# Patient Record
Sex: FEMALE | Race: OTHER | Employment: UNEMPLOYED | URBAN - METROPOLITAN AREA
[De-identification: names, ages, dates, MRNs, and addresses within clinical notes are randomized per-mention and may not be internally consistent; named-entity substitution may affect disease eponyms.]

---

## 2022-12-29 ENCOUNTER — HOSPITAL ENCOUNTER (EMERGENCY)
Age: 27
Discharge: HOME | End: 2022-12-29
Attending: EMERGENCY MEDICINE
Payer: MEDICAID

## 2022-12-29 VITALS
RESPIRATION RATE: 20 BRPM | DIASTOLIC BLOOD PRESSURE: 80 MMHG | HEART RATE: 88 BPM | OXYGEN SATURATION: 100 % | TEMPERATURE: 98.1 F | SYSTOLIC BLOOD PRESSURE: 149 MMHG

## 2022-12-29 DIAGNOSIS — G47.00 INSOMNIA, UNSPECIFIED TYPE: ICD-10-CM

## 2022-12-29 DIAGNOSIS — O23.11 ACUTE CYSTITIS DURING PREGNANCY IN FIRST TRIMESTER: Primary | ICD-10-CM

## 2022-12-29 LAB
ALBUMIN SERPL-MCNC: 3.9 G/DL (ref 3.5–5)
ALBUMIN/GLOB SERPL: 1.2 {RATIO} (ref 1.1–2.2)
ALP SERPL-CCNC: 50 U/L (ref 45–117)
ALT SERPL-CCNC: 16 U/L (ref 12–78)
ANION GAP SERPL CALC-SCNC: 6 MMOL/L (ref 5–15)
APPEARANCE UR: ABNORMAL
AST SERPL-CCNC: 16 U/L (ref 15–37)
BACTERIA URNS QL MICRO: ABNORMAL /HPF
BASOPHILS # BLD: 0.1 K/UL (ref 0–0.1)
BASOPHILS NFR BLD: 1 % (ref 0–1)
BILIRUB SERPL-MCNC: 0.4 MG/DL (ref 0.2–1)
BILIRUB UR QL: NEGATIVE
BUN SERPL-MCNC: 9 MG/DL (ref 6–20)
BUN/CREAT SERPL: 15 (ref 12–20)
CALCIUM SERPL-MCNC: 9.1 MG/DL (ref 8.5–10.1)
CHLORIDE SERPL-SCNC: 103 MMOL/L (ref 97–108)
CO2 SERPL-SCNC: 26 MMOL/L (ref 21–32)
COLOR UR: ABNORMAL
COMMENT, HOLDF: NORMAL
CREAT SERPL-MCNC: 0.61 MG/DL (ref 0.55–1.02)
DIFFERENTIAL METHOD BLD: ABNORMAL
EOSINOPHIL # BLD: 0.1 K/UL (ref 0–0.4)
EOSINOPHIL NFR BLD: 1 % (ref 0–7)
EPITH CASTS URNS QL MICRO: ABNORMAL /LPF
ERYTHROCYTE [DISTWIDTH] IN BLOOD BY AUTOMATED COUNT: 13.5 % (ref 11.5–14.5)
GLOBULIN SER CALC-MCNC: 3.3 G/DL (ref 2–4)
GLUCOSE SERPL-MCNC: 74 MG/DL (ref 65–100)
GLUCOSE UR STRIP.AUTO-MCNC: NEGATIVE MG/DL
HCG UR QL: POSITIVE
HCT VFR BLD AUTO: 40.3 % (ref 35–47)
HGB BLD-MCNC: 13.2 G/DL (ref 11.5–16)
HGB UR QL STRIP: ABNORMAL
HYALINE CASTS URNS QL MICRO: ABNORMAL /LPF (ref 0–5)
IMM GRANULOCYTES # BLD AUTO: 0 K/UL (ref 0–0.04)
IMM GRANULOCYTES NFR BLD AUTO: 0 % (ref 0–0.5)
KETONES UR QL STRIP.AUTO: NEGATIVE MG/DL
LEUKOCYTE ESTERASE UR QL STRIP.AUTO: ABNORMAL
LYMPHOCYTES # BLD: 3.7 K/UL (ref 0.8–3.5)
LYMPHOCYTES NFR BLD: 40 % (ref 12–49)
MCH RBC QN AUTO: 28.2 PG (ref 26–34)
MCHC RBC AUTO-ENTMCNC: 32.8 G/DL (ref 30–36.5)
MCV RBC AUTO: 86.1 FL (ref 80–99)
MONOCYTES # BLD: 0.8 K/UL (ref 0–1)
MONOCYTES NFR BLD: 8 % (ref 5–13)
NEUTS SEG # BLD: 4.6 K/UL (ref 1.8–8)
NEUTS SEG NFR BLD: 50 % (ref 32–75)
NITRITE UR QL STRIP.AUTO: POSITIVE
NRBC # BLD: 0 K/UL (ref 0–0.01)
NRBC BLD-RTO: 0 PER 100 WBC
PH UR STRIP: 7.5 [PH] (ref 5–8)
PLATELET # BLD AUTO: 268 K/UL (ref 150–400)
PMV BLD AUTO: 9.1 FL (ref 8.9–12.9)
POTASSIUM SERPL-SCNC: 3.8 MMOL/L (ref 3.5–5.1)
PROT SERPL-MCNC: 7.2 G/DL (ref 6.4–8.2)
PROT UR STRIP-MCNC: NEGATIVE MG/DL
RBC # BLD AUTO: 4.68 M/UL (ref 3.8–5.2)
RBC #/AREA URNS HPF: ABNORMAL /HPF (ref 0–5)
SAMPLES BEING HELD,HOLD: NORMAL
SODIUM SERPL-SCNC: 135 MMOL/L (ref 136–145)
SP GR UR REFRACTOMETRY: 1.02 (ref 1–1.03)
UR CULT HOLD, URHOLD: NORMAL
UROBILINOGEN UR QL STRIP.AUTO: 0.2 EU/DL (ref 0.2–1)
WBC # BLD AUTO: 9.2 K/UL (ref 3.6–11)
WBC URNS QL MICRO: ABNORMAL /HPF (ref 0–4)

## 2022-12-29 PROCEDURE — 99283 EMERGENCY DEPT VISIT LOW MDM: CPT

## 2022-12-29 PROCEDURE — 87086 URINE CULTURE/COLONY COUNT: CPT

## 2022-12-29 PROCEDURE — 36415 COLL VENOUS BLD VENIPUNCTURE: CPT

## 2022-12-29 PROCEDURE — 85025 COMPLETE CBC W/AUTO DIFF WBC: CPT

## 2022-12-29 PROCEDURE — 81001 URINALYSIS AUTO W/SCOPE: CPT

## 2022-12-29 PROCEDURE — 81025 URINE PREGNANCY TEST: CPT

## 2022-12-29 PROCEDURE — 80053 COMPREHEN METABOLIC PANEL: CPT

## 2022-12-29 RX ORDER — CEPHALEXIN 500 MG/1
500 CAPSULE ORAL 2 TIMES DAILY
Qty: 14 CAPSULE | Refills: 0 | Status: SHIPPED | OUTPATIENT
Start: 2022-12-29 | End: 2023-01-05

## 2022-12-29 NOTE — DISCHARGE INSTRUCTIONS
Take new medication as prescribed. Continue to monitor symptoms at home. Take Tylenol as needed for pain. Follow-up with PCP and return with any changes or worsening.

## 2022-12-29 NOTE — ED PROVIDER NOTES
32 y.o.  female at ~4-6 weeks gestation with no significant PMH presents to ED with 1 week of insomnia and fatigue. She notes that she feels weak and fatigued in the morning due to not being able to sleep very well. She reports that she has only been able to sleep for about 3 hours per night. She reports \"I toss and turn all night and now my shoulders hurt\". She also adds that for the past 3-4 months she has had dysuria with urinary pressure. She had talked to a doctor about this and she was told that this was likely due to the fibroids. Was supposed to have a surgery to remove her fibroids at [facility] on  but had to cancel this due to pregnancy. She reports that she believes she is about 4-6 weeks pregnant. She notes that her LMP was ; has not seen a OB/GYN yet but is working on getting an appointment. Denies any fevers, chills, vomiting, diarrhea, CP, SOB. The history is provided by the patient. A  was used. Dysuria   Pertinent negatives include no nausea and no vomiting. Insomnia   Functional status baseline:  [EPIC#1537^NOTE}      History reviewed. No pertinent past medical history. History reviewed. No pertinent surgical history. History reviewed. No pertinent family history.     Social History     Socioeconomic History    Marital status: Not on file     Spouse name: Not on file    Number of children: Not on file    Years of education: Not on file    Highest education level: Not on file   Occupational History    Not on file   Tobacco Use    Smoking status: Not on file    Smokeless tobacco: Not on file   Substance and Sexual Activity    Alcohol use: Not on file    Drug use: Not on file    Sexual activity: Not on file   Other Topics Concern    Not on file   Social History Narrative    Not on file     Social Determinants of Health     Financial Resource Strain: Not on file   Food Insecurity: Not on file   Transportation Needs: Not on file   Physical Activity: Not on file   Stress: Not on file   Social Connections: Not on file   Intimate Partner Violence: Not on file   Housing Stability: Not on file         ALLERGIES: Patient has no known allergies. Review of Systems   Constitutional:  Negative for fever. HENT:  Negative for congestion and sinus pressure. Respiratory:  Negative for shortness of breath. Cardiovascular:  Negative for chest pain. Gastrointestinal:  Negative for nausea and vomiting. Genitourinary:  Positive for difficulty urinating and dysuria. Musculoskeletal:  Negative for myalgias. Neurological:  Negative for dizziness and headaches. Hematological:  Negative for adenopathy. Psychiatric/Behavioral:  Positive for sleep disturbance. The patient has insomnia. The patient is not nervous/anxious. All other systems reviewed and are negative. Vitals:    12/29/22 1350   BP: (!) 149/80   Pulse: 88   Resp: 20   Temp: 98.1 °F (36.7 °C)   SpO2: 100%            Physical Exam  Vitals and nursing note reviewed. Constitutional:       General: She is not in acute distress. Appearance: Normal appearance. She is normal weight. HENT:      Head: Normocephalic and atraumatic. Eyes:      Extraocular Movements: Extraocular movements intact. Pupils: Pupils are equal, round, and reactive to light. Cardiovascular:      Rate and Rhythm: Normal rate and regular rhythm. Heart sounds: Normal heart sounds. Pulmonary:      Breath sounds: Normal breath sounds. Abdominal:      Palpations: Abdomen is soft. Tenderness: There is no abdominal tenderness. Lymphadenopathy:      Cervical: No cervical adenopathy. Skin:     General: Skin is warm and dry. Neurological:      General: No focal deficit present. Mental Status: She is alert and oriented to person, place, and time. Psychiatric:         Mood and Affect: Mood normal.         Behavior: Behavior normal.         Thought Content:  Thought content normal.        MDM  Number of Diagnoses or Management Options  Acute cystitis during pregnancy in first trimester  Insomnia, unspecified type  Diagnosis management comments: 32 y.o.  female at ~4-6 weeks gestation with no significant PMH presents to ED with 1 week of insomnia and fatigue. Vital signs stable in triage and patient afebrile. Physical exam unremarkable with abdomen soft and nontender to palpation with no tenderness. Labs unremarkable aside from urine which revealed indication of urinary tract infection. Urine culture sent and patient will be discharged with antibiotics and instructions for conservative care, follow-up and return precautions. She is planning on follow-up with OB/GYN soon.        Amount and/or Complexity of Data Reviewed  Clinical lab tests: reviewed           Procedures

## 2023-01-01 LAB
BACTERIA SPEC CULT: ABNORMAL
CC UR VC: ABNORMAL
SERVICE CMNT-IMP: ABNORMAL

## 2023-01-03 NOTE — PROGRESS NOTES
Sonny Panchal is a 32 y.o. female presents for a new pregnancy visit. Chief Complaint   Patient presents with    Initial Prenatal Visit           Patient's last menstrual period was 2022. Last Pap: normal obtained 2 months ago. LMP history:  The date of her LMP is 01/4/15 certain. Her last menstrual period was normal and lasted for 4 to 5 days. A urine pregnancy test was positive z weeks ago. She was not on the pill at conception. Based on her LMP, her EDC is 23 and her EGA is 9 weeks,3 days. Her menstrual cycles are regular and occur approximately every 28 days  and range from 3 to 5 days. The last menses lasted 22 the usual number of days. Ultrasound data:  She had an  ultrasound done by Alek Montelongo  today which revealed a viable subramanian pregnancy with a gestational age of *** weeks and *** days giving an EDC of ***. Pregnancy symptoms:    Since her LMP she has experienced  urinary frequency, breast tenderness, and nausea. She  hasbeen vomiting over the last few weeks. Associated signs and symptoms which she denies: dysuria, discharge, vaginal bleeding. She states she has gained weight:  Approximately 5 pounds over the last few weeks. Relevant past pregnancy history:   She has the following pregnancy history: ***    She has no history of  delivery. Relevant past medical history:(relevant to this pregnancy): noncontributory. Her occupation is: unemployed. 1. Have you been to the ER, urgent care clinic, or hospitalized since your last visit? No    2. Have you seen or consulted any other health care providers outside of the [address] [place] [address] since your last visit? No    Examination chaperoned by Mariela Day LPN.

## 2023-01-06 ENCOUNTER — INITIAL PRENATAL (OUTPATIENT)
Dept: OBGYN CLINIC | Age: 28
End: 2023-01-06
Payer: MEDICAID

## 2023-01-06 VITALS — DIASTOLIC BLOOD PRESSURE: 65 MMHG | SYSTOLIC BLOOD PRESSURE: 105 MMHG | WEIGHT: 110.9 LBS

## 2023-01-06 DIAGNOSIS — Z34.90 ENCOUNTER FOR PRENATAL CARE: ICD-10-CM

## 2023-01-06 DIAGNOSIS — A64 STD (FEMALE): ICD-10-CM

## 2023-01-06 DIAGNOSIS — Z34.00 ENCOUNTER FOR SUPERVISION OF NORMAL FIRST PREGNANCY, UNSPECIFIED TRIMESTER: Primary | ICD-10-CM

## 2023-01-06 DIAGNOSIS — Z12.4 ENCOUNTER FOR PAPANICOLAOU SMEAR FOR CERVICAL CANCER SCREENING: ICD-10-CM

## 2023-01-06 LAB — N. GONORRHOEAE, EXTERNAL RESULT: NEGATIVE

## 2023-01-06 NOTE — PROGRESS NOTES
Current pregnancy history:    Emanuel Guevara is a ,  32 y.o. female / Patient's last menstrual period was 2022. She presents for the evaluation of amenorrhea and a positive pregnancy test.    Patient's last menstrual period was 2022. Last Pap: normal obtained 2 months ago. LMP history:  The date of her LMP is  certain. Her last menstrual period was normal and lasted for 4 to 5 days. A urine pregnancy test was positive z weeks ago. She was not on the pill at conception. Based on her LMP, her EDC is 23 and her EGA is 9 weeks, 3 days. Her menstrual cycles are regular and occur approximately every 28 days  and range from 3 to 5 days. The last menses lasted 22 the usual number of days. Ultrasound data:  She had an  ultrasound done by Rangel Shrestha  today which revealed a viable subramanian pregnancy with a gestational age of 10 weeks and 1 day giving an EDC of 8/10/23. Pregnancy symptoms:     Since her LMP she has experienced  urinary frequency, breast tenderness, and nausea. She  hasbeen vomiting over the last few weeks. Associated signs and symptoms which she denies: dysuria, discharge, vaginal bleeding. She states she has gained weight:  Approximately 5 pounds over the last few weeks. Relevant past pregnancy history:              She has the following pregnancy history: first pregnancy               She has no history of  delivery. Relevant past medical history:(relevant to this pregnancy): noncontributory. Substance history: negative for alcohol, tobacco and street drugs. Positive for nothing. Exposure history: There is/are no indoor cat/s in the home. The patient was instructed to not change the cat litter. She has had chicken pox or the vaccine in the past.   Patient denies issues with domestic violence.      Genetic Screening/Teratology Counseling: (Includes patient, baby's father, or anyone in either family with:)  3.  Patient's age >/= 28 at [facility]?-- no.   2.  Thalassemia ([place], [place], [address], or  background): MCV<80?--no.     3.  Neural tube defect (meningomyelocele, spina bifida, anencephaly)?--no.   4.  Congenital heart defect?--no.  5.  Down syndrome?--no.   6.  Marcos-Sachs ([place], [place])?--no.   7.  Canavan's Disease?--no.   8.  Familial Dysautonomia?--no.   9.  Sickle cell disease or trait ()? --no   The patient has not been tested for sickle trait  10. Hemophilia or other blood disorders?--no. 11.  Muscular dystrophy?--no. 12.  Cystic fibrosis?--no. 13.  Zeferino's Chorea?--no. 14.  Mental retardation/autism (if yes was person tested for Fragile X)?--no. 15.  Other inherited genetic or chromosomal disorder?--no. 12.  Maternal metabolic disorder (DM, PKU, etc)?--no. 17.  Patient or FOB with a child with a birth defect not listed above?--no.  17a. Patient or FOB with a birth defect themselves?--no. 18.  Recurrent pregnancy loss, or stillbirth?--no. 19.  Any medications since LMP other than prenatal vitamins (include vitamins, supplements, OTC meds, drugs, alcohol)?--no. 20.  Any other genetic/environmental exposure to discuss?--no. Infection History:  1. Lives with someone with TB or TB exposed?--no.   2.  Patient or partner has history of genital herpes?--no.  3.  Rash or viral illness since LMP?--no.    4.  History of STD (GC, CT, HPV, syphilis, HIV)? --no   5. Other: OTHER? Past Medical History:   Diagnosis Date    Fibroids      No past surgical history on file.   Social History     Occupational History    Not on file   Tobacco Use    Smoking status: Not on file    Smokeless tobacco: Not on file   Substance and Sexual Activity    Alcohol use: Not on file    Drug use: Not on file    Sexual activity: Not on file     Family History   Problem Relation Age of Onset    Breast Cancer Maternal Aunt      OB History    Para Term  AB Living   1 SAB IAB Ectopic Molar Multiple Live Births                    # Outcome Date GA Lbr Amilcar/2nd Weight Sex Delivery Anes PTL Lv   1 Current              No Known Allergies  Prior to Admission medications    Medication Sig Start Date End Date Taking? Authorizing Provider   docosahexaenoic acid (PRENATAL DHA PO) Take  by mouth. Yes Provider, Historical   cephALEXin (Keflex) 500 mg capsule Take 1 Capsule by mouth two (2) times a day for 7 days. 12/29/22 1/5/23  YARITZA Marti        Review of Systems: History obtained from the patient  Constitutional: negative for weight loss, fever, night sweats  HEENT: negative for hearing loss, earache, congestion, snoring, sore throat  CV: negative for chest pain, palpitations, edema  Resp: negative for cough, shortness of breath, wheezing  Breast: negative for breast lumps, nipple discharge, galactorrhea  GI: negative for change in bowel habits, abdominal pain, black or bloody stools  : negative for frequency, dysuria, hematuria, vaginal discharge  MSK: negative for back pain, joint pain, muscle pain  Skin: negative for itching, rash, hives  Neuro: negative for dizziness, headache, confusion, weakness  Psych: negative for anxiety, depression, change in mood  Heme/lymph: negative for bleeding, bruising, pallor    Objective:  Visit Vitals  /65   Wt 110 lb 14.4 oz (50.3 kg)   LMP 11/01/2022       Physical Exam:     Constitutional  Appearance: well-nourished, well developed, alert, in no acute distress    HENT  Head  Face: appears normal  Eyes: appear normal  Ears: normal  Mouth: normal  Lips: no lesions    Neck  Inspection/Palpation: normal appearance, no masses or tenderness  Lymph Nodes: no lymphadenopathy present  Thyroid: gland size normal, nontender, no nodules or masses present on palpation    Chest  Respiratory Effort: breathing unlabored  Auscultation: normal breath sounds    Cardiovascular  Heart:   Auscultation: regular rate and rhythm without murmur    Breasts  Inspection of Breasts: breasts symmetrical, no skin changes, no discharge present, nipple appearance normal, no skin retraction present  Palpation of Breasts and Axillae: no masses present on palpation, no breast tenderness  Axillary Lymph Nodes: no lymphadenopathy present    Gastrointestinal  Abdominal Examination: abdomen non-tender to palpation, normal bowel sounds, no masses present  Liver and spleen: no hepatomegaly present, spleen not palpable  Hernias: no hernias identified    Genitourinary  External Genitalia: normal appearance for age, no discharge present, no tenderness present, no inflammatory lesions present, no masses present, no atrophy present  Vagina: normal vaginal vault without central or paravaginal defects, no discharge present, no inflammatory lesions present, no masses present  Bladder: non-tender to palpation  Urethra: appears normal  Cervix: normal   Uterus: enlarged, normal shape, soft  Adnexa: no adnexal tenderness present, no adnexal masses present  Perineum: perineum within normal limits, no evidence of trauma, no rashes or skin lesions present  Anus: anus within normal limits, no hemorrhoids present  Inguinal Lymph Nodes: no lymphadenopathy present    Skin  General Inspection: no rash, no lesions identified    Neurologic/Psychiatric  Mental Status:  Orientation: grossly oriented to person, place and time  Mood and Affect: mood normal, affect appropriate    Assessment:   Intrauterine pregnancy with the following problems identified:  Pedunculated fibroid left side--5 cm at 9/ declines CFDNA  . Plan:     Offered CFDNA testing, discussed MSAFP  Course of pregnancy discussed including visit schedule, routine U/S, glucola testing, etc.  Avoid alcoholic beverages and illicit/recreational drugs use  Advised prenatal vitamins or folic acid daily. Hospital and practice style discussed with [facility] coverage system.   Discussed nutrition, toxoplasmosis precautions, sexual activity, exercise, need for influenza,TDAP, Covid vaccines, environmental and work hazards, travel advice, need for seat belts. Discussed dietary concerns regarding mercury in seafood, Listeria in unpasteurized dairy products, deli meat. Patient encouraged not to smoke. Discussed current prescription drug use. Given medication list.  Discussed the use of over the counter medications and chemicals. Route of delivery discussed, including risks, benefits, and alternatives of  versus repeat LTCS. Pt understands risk of hemorrhage during pregnancy and post delivery and would accept blood products if necessary in life-threatening emergencies    Handouts given to pt. Transvaginal First Trimester Ultrasound Procedure    Marcio Tony is a ,  32 y.o. female / Patient's last menstrual period was 2022. This makes her currently 8 weeks and 1 days of gestation by dates. She is here today for early pregnancy ultrasound for pregnancy dating. Ultrasound results:   A subramanian intrauterine pregnancy with visible cardiac activity is seen. The yolk sac is visible and measures approximately 0.44 cm in diameter.   The crown rump length of the fetus is measurable at 2.44 cm, consistent with 9 weeks and 1 day  Subchorionic hemorrhage was not seen  Right Ovary - NORMAL   Left Ovary - NORMAL   Comment: Pedunculated fibroid left side 5 cm

## 2023-01-08 LAB
A VAGINAE DNA VAG QL NAA+PROBE: NORMAL SCORE
BVAB2 DNA VAG QL NAA+PROBE: NORMAL SCORE
C ALBICANS DNA VAG QL NAA+PROBE: NEGATIVE
C GLABRATA DNA VAG QL NAA+PROBE: NEGATIVE
C TRACH DNA VAG QL NAA+PROBE: NEGATIVE
MEGA1 DNA VAG QL NAA+PROBE: NORMAL SCORE
N GONORRHOEA DNA VAG QL NAA+PROBE: NEGATIVE
T VAGINALIS DNA VAG QL NAA+PROBE: NEGATIVE

## 2023-01-20 ENCOUNTER — ROUTINE PRENATAL (OUTPATIENT)
Dept: OBGYN CLINIC | Age: 28
End: 2023-01-20
Payer: MEDICAID

## 2023-01-20 VITALS — DIASTOLIC BLOOD PRESSURE: 68 MMHG | WEIGHT: 111 LBS | SYSTOLIC BLOOD PRESSURE: 115 MMHG

## 2023-01-20 DIAGNOSIS — Z34.90 ENCOUNTER FOR PRENATAL CARE: Primary | ICD-10-CM

## 2023-01-20 RX ORDER — TERCONAZOLE 4 MG/G
1 CREAM VAGINAL
Qty: 45 G | Refills: 0 | Status: SHIPPED | OUTPATIENT
Start: 2023-01-20

## 2023-01-20 NOTE — LETTER
1/20/2023 3:45 PM    Ms. Alex Calderon  [address] [phone]    To whom it may concern,  Please excuse  Ms. Alex Calderon from exceeding eight hours of work per day for the remainder of her pregnancy with an MARKUS of 8/8/2023. If you have any questions or concerns please have the patient contact us.                Sincerely,        Paty Dumont MD

## 2023-01-27 ENCOUNTER — LAB ONLY (OUTPATIENT)
Dept: OBGYN CLINIC | Age: 28
End: 2023-01-27

## 2023-01-27 DIAGNOSIS — Z34.90 ENCOUNTER FOR PRENATAL CARE: Primary | ICD-10-CM

## 2023-01-27 DIAGNOSIS — Z13.79 GENETIC TESTING: ICD-10-CM

## 2023-01-27 LAB
ABO, EXTERNAL RESULT: NORMAL
ANTIBODY SCREEN, EXTERNAL: NEGATIVE
HBSAG, EXTERNAL: NEGATIVE
HEP B, EXTERNAL RESULT: NEGATIVE
HEPATITIS C AB,   EXT: NEGATIVE
HEPATITIS C ANTIBODY, EXTERNAL RESULT: NEGATIVE
HIV, EXTERNAL RESULT: NONREACTIVE
HIV, EXTERNAL: NEGATIVE
RPR, EXTERNAL RESULT: NEGATIVE
RPR, EXTERNAL: NEGATIVE
RUBELLA TITER, EXTERNAL RESULT: NORMAL
RUBELLA, EXTERNAL: NORMAL
TYPE, ABO & RH, EXTERNAL: NORMAL

## 2023-02-02 ENCOUNTER — APPOINTMENT (OUTPATIENT)
Dept: ULTRASOUND IMAGING | Age: 28
End: 2023-02-02
Attending: EMERGENCY MEDICINE
Payer: MEDICAID

## 2023-02-02 ENCOUNTER — HOSPITAL ENCOUNTER (EMERGENCY)
Age: 28
Discharge: HOME | End: 2023-02-02
Attending: STUDENT IN AN ORGANIZED HEALTH CARE EDUCATION/TRAINING PROGRAM
Payer: MEDICAID

## 2023-02-02 ENCOUNTER — ROUTINE PRENATAL (OUTPATIENT)
Dept: OBGYN CLINIC | Age: 28
End: 2023-02-02
Payer: MEDICAID

## 2023-02-02 VITALS
TEMPERATURE: 98.3 F | BODY MASS INDEX: 22.38 KG/M2 | DIASTOLIC BLOOD PRESSURE: 76 MMHG | OXYGEN SATURATION: 100 % | RESPIRATION RATE: 16 BRPM | HEART RATE: 96 BPM | WEIGHT: 111 LBS | SYSTOLIC BLOOD PRESSURE: 119 MMHG | HEIGHT: 59 IN

## 2023-02-02 VITALS — BODY MASS INDEX: 22.72 KG/M2 | DIASTOLIC BLOOD PRESSURE: 57 MMHG | SYSTOLIC BLOOD PRESSURE: 91 MMHG | WEIGHT: 111.2 LBS

## 2023-02-02 DIAGNOSIS — N30.00 ACUTE CYSTITIS WITHOUT HEMATURIA: ICD-10-CM

## 2023-02-02 DIAGNOSIS — O46.90 VAGINAL BLEEDING IN PREGNANCY: Primary | ICD-10-CM

## 2023-02-02 DIAGNOSIS — D25.9 UTERINE LEIOMYOMA, UNSPECIFIED LOCATION: ICD-10-CM

## 2023-02-02 DIAGNOSIS — Z34.90 ENCOUNTER FOR PRENATAL CARE: Primary | ICD-10-CM

## 2023-02-02 LAB
ALBUMIN SERPL-MCNC: 3.4 G/DL (ref 3.5–5)
ALBUMIN/GLOB SERPL: 0.9 (ref 1.1–2.2)
ALP SERPL-CCNC: 44 U/L (ref 45–117)
ALT SERPL-CCNC: 15 U/L (ref 12–78)
AMORPH CRY URNS QL MICRO: ABNORMAL
ANION GAP SERPL CALC-SCNC: 8 MMOL/L (ref 5–15)
APPEARANCE UR: ABNORMAL
AST SERPL-CCNC: 11 U/L (ref 15–37)
BACTERIA URNS QL MICRO: ABNORMAL /HPF
BASOPHILS # BLD: 0.1 K/UL (ref 0–0.1)
BASOPHILS NFR BLD: 1 % (ref 0–1)
BILIRUB SERPL-MCNC: 0.4 MG/DL (ref 0.2–1)
BILIRUB UR QL: NEGATIVE
BUN SERPL-MCNC: 7 MG/DL (ref 6–20)
BUN/CREAT SERPL: 11 (ref 12–20)
CALCIUM SERPL-MCNC: 8.9 MG/DL (ref 8.5–10.1)
CHLORIDE SERPL-SCNC: 106 MMOL/L (ref 97–108)
CO2 SERPL-SCNC: 24 MMOL/L (ref 21–32)
COLOR UR: ABNORMAL
COMMENT, HOLDF: NORMAL
CREAT SERPL-MCNC: 0.62 MG/DL (ref 0.55–1.02)
DIFFERENTIAL METHOD BLD: ABNORMAL
EOSINOPHIL # BLD: 0.2 K/UL (ref 0–0.4)
EOSINOPHIL NFR BLD: 1 % (ref 0–7)
EPITH CASTS URNS QL MICRO: ABNORMAL /LPF
ERYTHROCYTE [DISTWIDTH] IN BLOOD BY AUTOMATED COUNT: 13.3 % (ref 11.5–14.5)
GLOBULIN SER CALC-MCNC: 3.8 G/DL (ref 2–4)
GLUCOSE SERPL-MCNC: 82 MG/DL (ref 65–100)
GLUCOSE UR STRIP.AUTO-MCNC: NEGATIVE MG/DL
HCG SERPL-ACNC: ABNORMAL MIU/ML (ref 0–6)
HCT VFR BLD AUTO: 37.9 % (ref 35–47)
HGB BLD-MCNC: 12.9 G/DL (ref 11.5–16)
HGB UR QL STRIP: ABNORMAL
IMM GRANULOCYTES # BLD AUTO: 0.1 K/UL (ref 0–0.04)
IMM GRANULOCYTES NFR BLD AUTO: 1 % (ref 0–0.5)
KETONES UR QL STRIP.AUTO: NEGATIVE MG/DL
LEUKOCYTE ESTERASE UR QL STRIP.AUTO: NEGATIVE
LYMPHOCYTES # BLD: 4 K/UL (ref 0.8–3.5)
LYMPHOCYTES NFR BLD: 33 % (ref 12–49)
Lab: NORMAL
MCH RBC QN AUTO: 28.5 PG (ref 26–34)
MCHC RBC AUTO-ENTMCNC: 34 G/DL (ref 30–36.5)
MCV RBC AUTO: 83.8 FL (ref 80–99)
MONOCYTES # BLD: 0.9 K/UL (ref 0–1)
MONOCYTES NFR BLD: 7 % (ref 5–13)
NEUTS SEG # BLD: 7 K/UL (ref 1.8–8)
NEUTS SEG NFR BLD: 57 % (ref 32–75)
NITRITE UR QL STRIP.AUTO: NEGATIVE
NRBC # BLD: 0 K/UL (ref 0–0.01)
NRBC BLD-RTO: 0 PER 100 WBC
PH UR STRIP: 8.5 (ref 5–8)
PLATELET # BLD AUTO: 286 K/UL (ref 150–400)
PMV BLD AUTO: 9 FL (ref 8.9–12.9)
POTASSIUM SERPL-SCNC: 3.7 MMOL/L (ref 3.5–5.1)
PROT SERPL-MCNC: 7.2 G/DL (ref 6.4–8.2)
PROT UR STRIP-MCNC: NEGATIVE MG/DL
RBC # BLD AUTO: 4.52 M/UL (ref 3.8–5.2)
RBC #/AREA URNS HPF: ABNORMAL /HPF (ref 0–5)
SAMPLES BEING HELD,HOLD: NORMAL
SODIUM SERPL-SCNC: 138 MMOL/L (ref 136–145)
SP GR UR REFRACTOMETRY: 1.01 (ref 1–1.03)
UR CULT HOLD, URHOLD: NORMAL
UROBILINOGEN UR QL STRIP.AUTO: 0.2 EU/DL (ref 0.2–1)
WBC # BLD AUTO: 12.3 K/UL (ref 3.6–11)
WBC URNS QL MICRO: ABNORMAL /HPF (ref 0–4)

## 2023-02-02 PROCEDURE — 85025 COMPLETE CBC W/AUTO DIFF WBC: CPT

## 2023-02-02 PROCEDURE — 80053 COMPREHEN METABOLIC PANEL: CPT

## 2023-02-02 PROCEDURE — 76801 OB US < 14 WKS SINGLE FETUS: CPT

## 2023-02-02 PROCEDURE — 99284 EMERGENCY DEPT VISIT MOD MDM: CPT

## 2023-02-02 PROCEDURE — 81001 URINALYSIS AUTO W/SCOPE: CPT

## 2023-02-02 PROCEDURE — 87086 URINE CULTURE/COLONY COUNT: CPT

## 2023-02-02 PROCEDURE — 36415 COLL VENOUS BLD VENIPUNCTURE: CPT

## 2023-02-02 PROCEDURE — 84702 CHORIONIC GONADOTROPIN TEST: CPT

## 2023-02-02 RX ORDER — CEPHALEXIN 500 MG/1
500 CAPSULE ORAL 4 TIMES DAILY
Qty: 28 CAPSULE | Refills: 0 | Status: SHIPPED | OUTPATIENT
Start: 2023-02-02 | End: 2023-02-09

## 2023-02-02 NOTE — ED PROVIDER NOTES
Please note that this dictation was completed with [email or web address], the computer voice recognition software. Quite often unanticipated grammatical, syntax, homophones, and other interpretive errors are inadvertently transcribed by the computer software. Please disregard these errors. Please excuse any errors that have escaped final proofreading. Patient is a 80-year-old female with history of uterine fibroids presenting to ED for evaluation of pelvic pain and vaginal bleeding in pregnancy. States that she is about 13 weeks pregnant and noticed some light bleeding this morning. She describes bleeding as a dark spotting, has not needed to change a pad. This is her first pregnancy and she is followed by Dr. Scarlett Cantu, has had normal routine OB care thus far. She has also been experiencing some nausea and vomiting throughout the pregnancy, last episode of vomiting was yesterday. Does not currently feel nauseous. Also states that she has been having some burning with urination but that this is \"always been like this\" and was treated with a cream by her OB/GYN. She denies fever, chills, vaginal discharge, hematuria, stool changes, or any additional medical complaints at this time. Past Medical History:   Diagnosis Date    Fibroids        No past surgical history on file.       Family History:   Problem Relation Age of Onset    Breast Cancer Maternal Aunt        Social History     Socioeconomic History    Marital status: UNKNOWN     Spouse name: Not on file    Number of children: Not on file    Years of education: Not on file    Highest education level: Not on file   Occupational History    Not on file   Tobacco Use    Smoking status: Not on file    Smokeless tobacco: Not on file   Substance and Sexual Activity    Alcohol use: Not on file    Drug use: Not on file    Sexual activity: Not on file   Other Topics Concern    Not on file   Social History Narrative    Not on file     Social Determinants of Health Financial Resource Strain: Not on file   Food Insecurity: Not on file   Transportation Needs: Not on file   Physical Activity: Not on file   Stress: Not on file   Social Connections: Not on file   Intimate Partner Violence: Not on file   Housing Stability: Not on file         ALLERGIES: Patient has no known allergies. Review of Systems   Constitutional:  Negative for fever. HENT:  Negative for congestion and sore throat. Eyes:  Negative for visual disturbance. Respiratory:  Negative for cough and shortness of breath. Cardiovascular:  Negative for chest pain. Gastrointestinal:  Negative for abdominal pain, diarrhea, nausea and vomiting. Genitourinary:  Positive for pelvic pain and vaginal bleeding. Negative for dysuria. Musculoskeletal:  Negative for back pain. Skin:  Negative for color change. Neurological:  Negative for dizziness and headaches. Psychiatric/Behavioral:  Negative for confusion. Vitals:    02/02/23 1020   BP: 119/76   Pulse: 96   Resp: 16   Temp: 98.3 °F (36.8 °C)   SpO2: 100%   Weight: 50.3 kg (111 lb)   Height: 4' 10.66\" (1.49 m)            Physical Exam  Vitals and nursing note reviewed. Constitutional:       General: She is not in acute distress. Appearance: Normal appearance. She is not ill-appearing. HENT:      Head: Normocephalic and atraumatic. Eyes:      General: Vision grossly intact. Extraocular Movements: Extraocular movements intact. Conjunctiva/sclera: Conjunctivae normal.   Neck:      Trachea: Phonation normal.   Cardiovascular:      Rate and Rhythm: Normal rate and regular rhythm. Heart sounds: Normal heart sounds. Pulmonary:      Effort: Pulmonary effort is normal.      Breath sounds: Normal breath sounds. Abdominal:      Palpations: Abdomen is soft. Tenderness: There is no abdominal tenderness. Musculoskeletal:         General: Normal range of motion. Cervical back: Normal range of motion.    Skin:     General: Skin is warm and dry. Neurological:      General: No focal deficit present. Mental Status: She is alert and oriented to person, place, and time. Medical Decision Making  This is a 33 yo G1 F who presents to the emergency room ambulatory with stable vitals signs with complaints of pelvic cramping and light vaginal bleeding, 13 weeks pregnant. I personally reviewed any available previous notes and chronic medical issues  Differential Diagnoses: Spontaneous , threatened miscarriage, fibroids, UTI, subchorionic hematoma, other benign pregnancy related vaginal bleeding, other  On physical exam her abdomen is soft and nontender. Pelvic exam deferred, no signs of hemorrhage due to clinical status and history. I ordered and interpreted the following tests: Per chart review she is Rh +. Labs here reassuring, hemoglobin stable ,slight leukocytosis which may be related to vomiting. Pelvic ultrasound with viable IUP with good fetal heart rate and fibroid. Urine with 1+ bacteria, culture ordered, may be contamination but will treat due to reported dysuria and pregnancy. Interventions and Plan: Will start on abx and advise patient on pelvic rest and follow-up with her OB. Return precautions outlined. Amount and/or Complexity of Data Reviewed  Labs: ordered. Decision-making details documented in ED Course. Radiology: ordered. Decision-making details documented in ED Course. ECG/medicine tests: ordered. ED Course as of 23 1112   Thu 2023   1111 US PREG UTS < 14 WKS SNGL  IMPRESSION  1. Single, living, intrauterine pregnancy with an AUA of 13 weeks, 3 days by  crown-rump length. 2.  Uterine fibroid. 3.  Unremarkable right ovary. Left ovary not visualized. [EP]   5610 HGB: 12.9 [EP]      ED Course User Index  [EP] YARITZA Ernandez     11:45 AM  Pt has been reevaluated. There are no new complaints, changes, or physical findings at this time.  All results have been reviewed with patient and/or family. Medications have been reviewed w/ pt and/or family. Pt and/or family's questions have been answered. Pt and/or family expressed good understanding of the dx/tx/rx and is in agreement with plan of care. Pt instructed and agreed to f/u w/ OBGYN and to return to ED upon further deterioration. Return precautions outlined. All questions answered at this time. Pt is stable and ready for discharge. IMPRESSION:  1. Vaginal bleeding in pregnancy    2. Uterine leiomyoma, unspecified location    3. Acute cystitis without hematuria        PLAN:  1. Current Discharge Medication List        START taking these medications    Details   cephALEXin (Keflex) 500 mg capsule Take 1 Capsule by mouth four (4) times daily for 7 days. Qty: 28 Capsule, Refills: 0  Start date: 2/2/2023, End date: 2/9/2023           2.    Follow-up Information       Follow up With Specialties Details Why Contact Info    Sabrina Hull MD Obstetrics & Gynecology, Gynecology, Obstetrics Schedule an appointment as soon as possible for a visit   [address] [place] [address] [phone]                Return to ED if worse     Procedures

## 2023-02-02 NOTE — ED TRIAGE NOTES
Pt arrives to the ER for complaints of vaginal bleeding and lower pelvic pain that started this morning. Pt reports that the bleeding is black. Pt reports that she is experiencing dysuria but states that this is normal.    Pt is currently 13 weeks pregnant with first child. Denies any PMH.

## 2023-02-02 NOTE — PROGRESS NOTES
Seen in ER with bleeding today.   US normal  Also reflux--rx discussed  Keep appt for 2 weeks for AFP

## 2023-02-03 LAB
BACTERIA SPEC CULT: NORMAL
SERVICE CMNT-IMP: NORMAL

## 2023-02-17 ENCOUNTER — ROUTINE PRENATAL (OUTPATIENT)
Dept: OBGYN CLINIC | Age: 28
End: 2023-02-17
Payer: MEDICAID

## 2023-02-17 VITALS — BODY MASS INDEX: 22.92 KG/M2 | WEIGHT: 112.2 LBS | DIASTOLIC BLOOD PRESSURE: 60 MMHG | SYSTOLIC BLOOD PRESSURE: 99 MMHG

## 2023-02-17 DIAGNOSIS — Z34.90 ENCOUNTER FOR PRENATAL CARE: Primary | ICD-10-CM

## 2023-02-17 PROCEDURE — 0502F SUBSEQUENT PRENATAL CARE: CPT | Performed by: OBSTETRICS & GYNECOLOGY

## 2023-02-23 ENCOUNTER — LAB ONLY (OUTPATIENT)
Dept: OBGYN CLINIC | Age: 28
End: 2023-02-23

## 2023-02-23 DIAGNOSIS — Z34.90 ENCOUNTER FOR PRENATAL CARE: Primary | ICD-10-CM

## 2023-02-25 LAB
AFP INTERP SERPL-IMP: NORMAL
AFP INTERP SERPL-IMP: NORMAL
AFP MOM SERPL: 1.36
AFP SERPL-MCNC: 59.7 NG/ML
AFP, MATERNAL, EXTERNAL: NEGATIVE
AGE AT DELIVERY: 28.2 YR
COMMENT, 018013: NORMAL
GA METHOD: NORMAL
GA: 16.2 WEEKS
IDDM PATIENT QL: NO
MULTIPLE PREGNANCY: NO
NEURAL TUBE DEFECT RISK FETUS: 4034 %
RESULTS, 017004: NORMAL

## 2023-03-18 ENCOUNTER — HOSPITAL ENCOUNTER (EMERGENCY)
Age: 28
Discharge: HOME | End: 2023-03-18
Attending: EMERGENCY MEDICINE
Payer: MEDICAID

## 2023-03-18 ENCOUNTER — APPOINTMENT (OUTPATIENT)
Dept: ULTRASOUND IMAGING | Age: 28
End: 2023-03-18
Attending: NURSE PRACTITIONER
Payer: MEDICAID

## 2023-03-18 VITALS
RESPIRATION RATE: 16 BRPM | TEMPERATURE: 97.4 F | WEIGHT: 118.4 LBS | DIASTOLIC BLOOD PRESSURE: 67 MMHG | SYSTOLIC BLOOD PRESSURE: 103 MMHG | HEIGHT: 59 IN | BODY MASS INDEX: 23.87 KG/M2 | OXYGEN SATURATION: 100 % | HEART RATE: 102 BPM

## 2023-03-18 DIAGNOSIS — D72.829 LEUKOCYTOSIS, UNSPECIFIED TYPE: ICD-10-CM

## 2023-03-18 DIAGNOSIS — R10.9 ABDOMINAL PAIN DURING PREGNANCY IN SECOND TRIMESTER: Primary | ICD-10-CM

## 2023-03-18 DIAGNOSIS — O26.892 ABDOMINAL PAIN DURING PREGNANCY IN SECOND TRIMESTER: Primary | ICD-10-CM

## 2023-03-18 LAB
ALBUMIN SERPL-MCNC: 3.5 G/DL (ref 3.5–5.2)
ALBUMIN/GLOB SERPL: 1.2 (ref 1.1–2.2)
ALP SERPL-CCNC: 64 U/L (ref 35–104)
ALT SERPL-CCNC: 12 U/L (ref 10–35)
ANION GAP SERPL CALC-SCNC: 9 MMOL/L (ref 5–15)
APPEARANCE UR: CLEAR
AST SERPL-CCNC: 20 U/L (ref 10–35)
BACTERIA URNS QL MICRO: ABNORMAL /HPF
BASOPHILS # BLD: 0.1 K/UL (ref 0–1)
BASOPHILS NFR BLD: 1 % (ref 0–1)
BILIRUB SERPL-MCNC: 0.2 MG/DL (ref 0.2–1)
BILIRUB UR QL: NEGATIVE
BUN SERPL-MCNC: 7 MG/DL (ref 6–20)
BUN/CREAT SERPL: 15 (ref 12–20)
CALCIUM SERPL-MCNC: 8.7 MG/DL (ref 8.6–10)
CHLORIDE SERPL-SCNC: 104 MMOL/L (ref 98–107)
CO2 SERPL-SCNC: 24 MMOL/L (ref 22–29)
COLOR UR: ABNORMAL
COMMENT, HOLDF: NORMAL
CREAT SERPL-MCNC: 0.47 MG/DL (ref 0.5–0.9)
DIFFERENTIAL METHOD BLD: ABNORMAL
EOSINOPHIL # BLD: 0.2 K/UL (ref 0–0.4)
EOSINOPHIL NFR BLD: 1 %
EPITH CASTS URNS QL MICRO: ABNORMAL /LPF
ERYTHROCYTE [DISTWIDTH] IN BLOOD BY AUTOMATED COUNT: 14.6 % (ref 11.5–14.5)
GLOBULIN SER CALC-MCNC: 2.9 G/DL (ref 2–4)
GLUCOSE SERPL-MCNC: 95 MG/DL (ref 65–100)
GLUCOSE UR STRIP.AUTO-MCNC: NEGATIVE MG/DL
HCG SERPL-ACNC: ABNORMAL MIU/ML
HCT VFR BLD AUTO: 33.1 % (ref 35–47)
HGB BLD-MCNC: 11.2 G/DL (ref 11.5–16)
HGB UR QL STRIP: NEGATIVE
IMM GRANULOCYTES # BLD AUTO: 0.3 K/UL (ref 0–0.04)
IMM GRANULOCYTES NFR BLD AUTO: 2 % (ref 0–0.5)
KETONES UR QL STRIP.AUTO: NEGATIVE MG/DL
LEUKOCYTE ESTERASE UR QL STRIP.AUTO: NEGATIVE
LYMPHOCYTES # BLD: 2.3 K/UL (ref 0.8–3.5)
LYMPHOCYTES NFR BLD: 14 % (ref 12–49)
MCH RBC QN AUTO: 29.2 PG (ref 26–34)
MCHC RBC AUTO-ENTMCNC: 33.8 G/DL (ref 30–36.5)
MCV RBC AUTO: 86.2 FL (ref 80–99)
MONOCYTES # BLD: 1.2 K/UL (ref 0–1)
MONOCYTES NFR BLD: 7 % (ref 5–13)
NEUTS SEG # BLD: 13.1 K/UL (ref 1.8–8)
NEUTS SEG NFR BLD: 75 % (ref 32–75)
NITRITE UR QL STRIP.AUTO: NEGATIVE
NRBC # BLD: 0 K/UL (ref 0–0.01)
NRBC BLD-RTO: 0 PER 100 WBC
PH UR STRIP: 6 (ref 5–8)
PLATELET # BLD AUTO: 257 K/UL (ref 150–400)
PMV BLD AUTO: 9.1 FL (ref 8.9–12.9)
POTASSIUM SERPL-SCNC: 3.8 MMOL/L (ref 3.5–5.1)
PROT SERPL-MCNC: 6.4 G/DL (ref 6.4–8.3)
PROT UR STRIP-MCNC: NEGATIVE MG/DL
RBC # BLD AUTO: 3.84 M/UL (ref 3.8–5.2)
RBC #/AREA URNS HPF: ABNORMAL /HPF
SAMPLES BEING HELD,HOLD: NORMAL
SODIUM SERPL-SCNC: 137 MMOL/L (ref 136–145)
SP GR UR REFRACTOMETRY: 1.01 (ref 1–1.03)
UA: UC IF INDICATED,UAUC: ABNORMAL
UROBILINOGEN UR QL STRIP.AUTO: 0.2 EU/DL (ref 0.2–1)
WBC # BLD AUTO: 17.2 K/UL (ref 3.6–11)
WBC URNS QL MICRO: ABNORMAL /HPF (ref 0–4)

## 2023-03-18 PROCEDURE — 74011250636 HC RX REV CODE- 250/636: Performed by: NURSE PRACTITIONER

## 2023-03-18 PROCEDURE — 96361 HYDRATE IV INFUSION ADD-ON: CPT

## 2023-03-18 PROCEDURE — 84702 CHORIONIC GONADOTROPIN TEST: CPT

## 2023-03-18 PROCEDURE — 96374 THER/PROPH/DIAG INJ IV PUSH: CPT

## 2023-03-18 PROCEDURE — 76815 OB US LIMITED FETUS(S): CPT

## 2023-03-18 PROCEDURE — 74011250637 HC RX REV CODE- 250/637: Performed by: NURSE PRACTITIONER

## 2023-03-18 PROCEDURE — 36415 COLL VENOUS BLD VENIPUNCTURE: CPT

## 2023-03-18 PROCEDURE — 85025 COMPLETE CBC W/AUTO DIFF WBC: CPT

## 2023-03-18 PROCEDURE — 76705 ECHO EXAM OF ABDOMEN: CPT

## 2023-03-18 PROCEDURE — 81001 URINALYSIS AUTO W/SCOPE: CPT

## 2023-03-18 PROCEDURE — 99284 EMERGENCY DEPT VISIT MOD MDM: CPT

## 2023-03-18 PROCEDURE — 80053 COMPREHEN METABOLIC PANEL: CPT

## 2023-03-18 RX ORDER — ACETAMINOPHEN 325 MG/1
650 TABLET ORAL ONCE
Status: COMPLETED | OUTPATIENT
Start: 2023-03-18 | End: 2023-03-18

## 2023-03-18 RX ORDER — SODIUM CHLORIDE 9 MG/ML
1000 INJECTION, SOLUTION INTRAVENOUS ONCE
Status: COMPLETED | OUTPATIENT
Start: 2023-03-18 | End: 2023-03-18

## 2023-03-18 RX ORDER — ONDANSETRON 2 MG/ML
4 INJECTION INTRAMUSCULAR; INTRAVENOUS
Status: COMPLETED | OUTPATIENT
Start: 2023-03-18 | End: 2023-03-18

## 2023-03-18 RX ORDER — SODIUM CHLORIDE 9 MG/ML
1000 INJECTION, SOLUTION INTRAVENOUS ONCE
Status: DISCONTINUED | OUTPATIENT
Start: 2023-03-18 | End: 2023-03-18 | Stop reason: HOSPADM

## 2023-03-18 RX ADMIN — ACETAMINOPHEN 650 MG: 325 TABLET ORAL at 18:24

## 2023-03-18 RX ADMIN — ONDANSETRON 4 MG: 2 INJECTION INTRAMUSCULAR; INTRAVENOUS at 15:23

## 2023-03-18 RX ADMIN — SODIUM CHLORIDE 1000 ML: 9 INJECTION, SOLUTION INTRAVENOUS at 15:23

## 2023-03-18 NOTE — ED PROVIDER NOTES
20-year-old female with history of uterine fibroids, Northern Irish-speaking   present. ID [id number]  Patient states that this is her first pregnancy and has had decreased fetal movement since yesterday. Patient complains of mid lower abdominal pain that started at 11 AM yesterday, is currently 5 months pregnant and has had a confirmed single IUP at approximately 3 months by Dr. Julian Lr, OB/GYN at [facility]. Patient has her next follow-up appointment this coming Wednesday. Patient states that she has gone to her OB/GYN appointments consistently and has been told that this has been a normal pregnancy thus far. Patient states that she took Mylanta or something for GERD this morning after she was drinking water and had 1 episode of vomiting. Last BM yesterday, denies difficulty with constipation, denies seeing blood in urine or stool. Patient states that she has had discomfort when urinating, fatigued and lower pelvic pain with pressure. Mya Faster  Denies tobacco, alcohol or illicit drug use. Denies fever, chills, feeling dizzy or light headed, CP, SOB, vaginal bleeding or vaginal discharge. Past Medical History:   Diagnosis Date    Fibroids        History reviewed. No pertinent surgical history.       Family History:   Problem Relation Age of Onset    Breast Cancer Maternal Aunt        Social History     Socioeconomic History    Marital status: SINGLE     Spouse name: Not on file    Number of children: Not on file    Years of education: Not on file    Highest education level: Not on file   Occupational History    Not on file   Tobacco Use    Smoking status: Never    Smokeless tobacco: Never   Substance and Sexual Activity    Alcohol use: Not Currently    Drug use: Never    Sexual activity: Not on file   Other Topics Concern    Not on file   Social History Narrative    Not on file     Social Determinants of Health     Financial Resource Strain: Not on file   Food Insecurity: Not on file Transportation Needs: Not on file   Physical Activity: Not on file   Stress: Not on file   Social Connections: Not on file   Intimate Partner Violence: Not on file   Housing Stability: Not on file         ALLERGIES: Patient has no known allergies. Review of Systems   Constitutional:  Positive for fatigue. Negative for chills and fever. Eyes:  Negative for visual disturbance. Respiratory:  Negative for shortness of breath. Cardiovascular:  Negative for chest pain. Gastrointestinal:  Positive for abdominal pain, nausea and vomiting. Negative for constipation. Mid to lower pulsating abdominal pain. Genitourinary:  Positive for dysuria and frequency. Negative for difficulty urinating, hematuria, pelvic pain, vaginal bleeding and vaginal discharge. Neurological:  Negative for dizziness, weakness and light-headedness. Vitals:    03/18/23 1831 03/18/23 1848 03/18/23 1849 03/18/23 1850   BP: 96/61 100/61 99/68 103/67   Pulse: 96 92 (!) 101 (!) 102   Resp: 16      Temp: 97.4 °F (36.3 °C)      SpO2: 100%      Weight:       Height:                Physical Exam  Vitals and nursing note reviewed. Constitutional:       General: She is not in acute distress. Appearance: Normal appearance. She is well-developed and normal weight. She is not ill-appearing. HENT:      Head: Normocephalic. Nose: Nose normal.   Cardiovascular:      Rate and Rhythm: Normal rate. Pulmonary:      Effort: Pulmonary effort is normal. No respiratory distress. Breath sounds: Normal breath sounds. No wheezing. Abdominal:      General: Abdomen is protuberant. Bowel sounds are normal. There is no distension. Palpations: Abdomen is soft. Tenderness: There is abdominal tenderness in the periumbilical area and suprapubic area. There is no right CVA tenderness, left CVA tenderness or guarding. Negative signs include Fishman's sign and McBurney's sign.    Musculoskeletal:         General: Normal range of motion. Cervical back: Normal range of motion. Skin:     General: Skin is warm and dry. Capillary Refill: Capillary refill takes less than 2 seconds. Neurological:      Mental Status: She is alert and oriented to person, place, and time. Psychiatric:         Mood and Affect: Mood normal.        Medical Decision Making  Differential DX: ectopic pregnancy vs threatened miscarriage vs miscarriage vs UTI vs pyelonephritis vs dehydration vs anemia    1. Previous notes (external to Emergency room) were reviewed: chart review    2. History was obtained by: patient    3. Chronic medical issues affecting this visit:   none    4. I ordered the following tests, followed by review of final reads by lab and radiology: cbc, cmp, beta hcg qt, abdominal ultrasound, urine    5. I considered ordering: transvaginal ultrasound    6. Medical intervention: IVF bolus      Surgical intervention: none indicated    7. Social determinants of health: none    8 Final diagnosis: abdominal pain during pregnancy in second trimester, leukocytosis. Medications prescribed: tylenol as needed. 9. Disposition: Discharge to home and follow up with PCP, return to the emergency room with worsening symptoms. Patient in agreement with plan of care. [facility]  for [address] [place] [address] # [id number], verified that upcoming appt. With OBGYN is this coming Wednesday. Lab results discussed with patient and family at bedside- WBC 17.2, noted previous elevation in WBC but this has increased, previous WBC 12.3 and 11.4. Hemoglobin 11.2 and hematocrit 33.1, with shift. Electrolytes within normal limits, BUN 7 creatinine 0.47, beta hCG quant 44, 877 with previous documentation of beta-hCG that was 173, 681 on February 2, 2023 along with noted ultrasound on that same date for a single IUP. Patient with blood pressure that is on the lower limits 99/67, currently denies feeling dizzy or lightheaded, endorses feeling fatigued otherwise stable at this time. Pending ultrasound of the abdomen results. Urine results negative for nitrates, leukocyte esterase, WBCs with 1+ bacteria. Inconsistent with acute UTI. Patient states that she is having intermittent cramping to the lower abdomen, discussed Tylenol use, will provide dose prior to leaving the ER. Ultrasound abdomen limited with no normal or abnormal appendix is identified, single living intrauterine pregnancy. No acute pathology is identified, showing 20 weeks and 4 days, heart rate 145 recommend OB follow-up. Discussed return precautions with patient, patient is currently tolerating p.o. intake without nausea vomiting, denies feeling dizzy or lightheaded, noted soft blood pressure 103/6 7 encourage patient to increase her oral fluids to maintain hydration, diet as tolerated and to call first thing Monday morning in order to see if she can move her OB appointment to either Monday or Tuesday. Patient advised for worsening of symptoms to return to the nearest ER, patient verbalized understanding agrees with plan. Problems Addressed:  Abdominal pain during pregnancy in second trimester: acute illness or injury  Leukocytosis, unspecified type: acute illness or injury    Amount and/or Complexity of Data Reviewed  Labs: ordered. Radiology: ordered. ECG/medicine tests: ordered. Discussion of management or test interpretation with external provider(s): Case discussed with Dr. So Murcia. Risk  OTC drugs. Prescription drug management. Procedures    VITAL SIGNS:  Patient Vitals for the past 4 hrs:   Temp Pulse Resp BP SpO2   03/18/23 1850 -- (!) 102 -- 103/67 --   03/18/23 1849 -- (!) 101 -- 99/68 --   03/18/23 1848 -- 92 -- 100/61 --   03/18/23 1831 97.4 °F (36.3 °C) 96 16 96/61 100 %   03/18/23 1818 -- -- -- 105/70 100 %   03/18/23 1658 -- -- -- 102/67 100 %           LABS:  The Following labs have been ordered while in the emergency department and I have independently evaluated them.      Recent Results (from the past 6 hour(s))   SAMPLES BEING HELD    Collection Time: 03/18/23  2:32 PM   Result Value Ref Range    SAMPLES BEING HELD 1 RED,1 PST,1 GOLD,1 PINK,1 LAV,1 BLUE     COMMENT        Add-on orders for these samples will be processed based on acceptable specimen integrity and analyte stability, which may vary by analyte. CBC WITH AUTOMATED DIFF    Collection Time: 03/18/23  2:32 PM   Result Value Ref Range    WBC 17.2 (H) 3.6 - 11.0 K/uL    RBC 3.84 3.80 - 5.20 M/uL    HGB 11.2 (L) 11.5 - 16.0 g/dL    HCT 33.1 (L) 35.0 - 47.0 %    MCV 86.2 80.0 - 99.0 FL    MCH 29.2 26.0 - 34.0 PG    MCHC 33.8 30.0 - 36.5 g/dL    RDW 14.6 (H) 11.5 - 14.5 %    PLATELET 661 786 - 565 K/uL    MPV 9.1 8.9 - 12.9 FL    NRBC 0.0 0  WBC    ABSOLUTE NRBC 0.00 0.00 - 0.01 K/uL    NEUTROPHILS 75 32 - 75 %    LYMPHOCYTES 14 12 - 49 %    MONOCYTES 7 5 - 13 %    EOSINOPHILS 1 (L) 7 %    BASOPHILS 1 0 - 1 %    IMMATURE GRANULOCYTES 2 (H) 0 - 0.5 %    ABS. NEUTROPHILS 13.1 (H) 1.8 - 8.0 K/UL    ABS. LYMPHOCYTES 2.3 0.8 - 3.5 K/UL    ABS. MONOCYTES 1.2 (H) 0.0 - 1.0 K/UL    ABS. EOSINOPHILS 0.2 0.0 - 0.4 K/UL    ABS. BASOPHILS 0.1 0 - 1 K/UL    ABS. IMM. GRANS. 0.3 (H) 0.00 - 0.04 K/UL    DF AUTOMATED     METABOLIC PANEL, COMPREHENSIVE    Collection Time: 03/18/23  2:32 PM   Result Value Ref Range    Sodium 137 136 - 145 mmol/L    Potassium 3.8 3.5 - 5.1 mmol/L    Chloride 104 98 - 107 mmol/L    CO2 24 22 - 29 mmol/L    Anion gap 9 5 - 15 mmol/L    Glucose 95 65 - 100 mg/dL    BUN 7 6 - 20 MG/DL    Creatinine 0.47 (L) 0.50 - 0.90 MG/DL    BUN/Creatinine ratio 15 12 - 20      eGFR >60 >60 ml/min/1.73m2    Calcium 8.7 8.6 - 10.0 MG/DL    Bilirubin, total 0.2 0.2 - 1.0 MG/DL    ALT (SGPT) 12 10 - 35 U/L    AST (SGOT) 20 10 - 35 U/L    Alk.  phosphatase 64 35 - 104 U/L    Protein, total 6.4 6.4 - 8.3 g/dL    Albumin 3.5 3.5 - 5.2 g/dL    Globulin 2.9 2.0 - 4.0 g/dL    A-G Ratio 1.2 1.1 - 2.2     BETA HCG, QT    Collection Time: 03/18/23 2:32 PM   Result Value Ref Range    Beta HCG, QT 44,877 (H) <7 MIU/ML   URINALYSIS W/ REFLEX CULTURE    Collection Time: 03/18/23  4:34 PM    Specimen: Urine   Result Value Ref Range    Color YELLOW/STRAW      Appearance CLEAR CLEAR      Specific gravity 1.006 1.003 - 1.030      pH (UA) 6.0 5.0 - 8.0      Protein Negative NEG mg/dL    Glucose Negative NEG mg/dL    Ketone Negative NEG mg/dL    Bilirubin Negative NEG      Blood Negative NEG      Urobilinogen 0.2 0.2 - 1.0 EU/dL    Nitrites Negative NEG      Leukocyte Esterase Negative NEG      WBC 0-4 0 - 4 /hpf    RBC 0-5 /hpf    Epithelial cells MANY (A) FEW /lpf    Bacteria 1+ (A) NEG /hpf    UA:UC IF INDICATED CULTURE NOT INDICATED BY UA RESULT            IMAGING:  The Following imaging studies have been ordered while in the emergency department and I have reviewed them. US PREG UTS LTD   Final Result   Single living intrauterine pregnancy. No acute pathology is   identified. Recommend obstetric follow-up. US ABD LTD   Final Result   No normal or abnormal appendix is identified. Medications During Visit:  I ordered/approved the ordering of the following medicines for the patient while in the emergency department. Medications   0.9% sodium chloride infusion 1,000 mL ( IntraVENous Canceled Entry 3/18/23 1835)   0.9% sodium chloride infusion 1,000 mL (0 mL IntraVENous IV Completed 3/18/23 1636)   ondansetron (ZOFRAN) injection 4 mg (4 mg IntraVENous Given 3/18/23 1523)   acetaminophen (TYLENOL) tablet 650 mg (650 mg Oral Given 3/18/23 1824)       IMPRESSION:  1. Abdominal pain during pregnancy in second trimester    2.  Leukocytosis, unspecified type        DISPOSITION:  Discharged      Discharge Medication List as of 3/18/2023  6:28 PM           Follow-up Information       Follow up With Specialties Details Why Aftab Workman MD Obstetrics & Gynecology, Gynecology, Obstetrics Schedule an appointment as soon as possible for a visit in 2 days  [address]      [facility] EMERGENCY DEPT Emergency Medicine  If symptoms worsen [address]  [phone]              The patient is asked to follow-up with their primary care provider in the next several days. They are to call tomorrow for an appointment. The patient is asked to return promptly for any increased concerns or worsening of symptoms. They can return to this emergency department or any other emergency department.     Jayne Farris NP  6:10 PM

## 2023-03-18 NOTE — ED NOTES
NP gave verbal order orthostatic VS. Results reviewed with NP. Given verbal order to provide pt w/ gatorade and d/c home. Pt provided discharge instructions, no prescriptions, education and follow up information. Pt verbalizing understanding. Pt A&Ox4, RA. Pain controlled. Patient ambulatory out of ER.

## 2023-03-18 NOTE — ED TRIAGE NOTES
present. ID [id number]    Patient arrives c/o mid-lower abd pain since 11am yesterday. Patient is 5 months pregnant this is patient's first pregnancy. Reports decreased fetal movement since yesterday. Vomited this morning after drinking water. Reports feeling fatigued difficulty urinating with pain  Patient denies fever, chills, chest pain, vaginal bleeding and discharge.      Reports history of fibroids

## 2023-03-18 NOTE — DISCHARGE INSTRUCTIONS
You may take Tylenol 650 mg every 6 hours as needed for pain and discomfort. Return to the nearest ER for any worsening or concerning symptoms.

## 2023-03-19 ENCOUNTER — HOSPITAL ENCOUNTER (EMERGENCY)
Age: 28
Discharge: HOME | End: 2023-03-19
Attending: OBSTETRICS & GYNECOLOGY | Admitting: OBSTETRICS & GYNECOLOGY
Payer: MEDICAID

## 2023-03-19 VITALS
RESPIRATION RATE: 16 BRPM | DIASTOLIC BLOOD PRESSURE: 56 MMHG | TEMPERATURE: 98.5 F | SYSTOLIC BLOOD PRESSURE: 98 MMHG | OXYGEN SATURATION: 100 % | HEART RATE: 80 BPM

## 2023-03-19 LAB
APPEARANCE UR: ABNORMAL
APPEARANCE UR: CLEAR
BACTERIA URNS QL MICRO: ABNORMAL /HPF
BACTERIA URNS QL MICRO: NEGATIVE /HPF
BILIRUB UR QL: NEGATIVE
BILIRUB UR QL: NEGATIVE
COLOR UR: ABNORMAL
COLOR UR: ABNORMAL
EPITH CASTS URNS QL MICRO: ABNORMAL /LPF
EPITH CASTS URNS QL MICRO: ABNORMAL /LPF
GLUCOSE UR STRIP.AUTO-MCNC: NEGATIVE MG/DL
GLUCOSE UR STRIP.AUTO-MCNC: NEGATIVE MG/DL
HGB UR QL STRIP: NEGATIVE
HGB UR QL STRIP: NEGATIVE
HYALINE CASTS URNS QL MICRO: ABNORMAL /LPF (ref 0–2)
KETONES UR QL STRIP.AUTO: NEGATIVE MG/DL
KETONES UR QL STRIP.AUTO: NEGATIVE MG/DL
LEUKOCYTE ESTERASE UR QL STRIP.AUTO: NEGATIVE
LEUKOCYTE ESTERASE UR QL STRIP.AUTO: NEGATIVE
NITRITE UR QL STRIP.AUTO: NEGATIVE
NITRITE UR QL STRIP.AUTO: NEGATIVE
PH UR STRIP: 7.5 (ref 5–8)
PH UR STRIP: 7.5 (ref 5–8)
PROT UR STRIP-MCNC: NEGATIVE MG/DL
PROT UR STRIP-MCNC: NEGATIVE MG/DL
RBC #/AREA URNS HPF: ABNORMAL /HPF (ref 0–5)
RBC #/AREA URNS HPF: ABNORMAL /HPF (ref 0–5)
SP GR UR REFRACTOMETRY: 1.01 (ref 1–1.03)
SP GR UR REFRACTOMETRY: 1.02 (ref 1–1.03)
UA: UC IF INDICATED,UAUC: ABNORMAL
UROBILINOGEN UR QL STRIP.AUTO: 1 EU/DL (ref 0.2–1)
UROBILINOGEN UR QL STRIP.AUTO: 1 EU/DL (ref 0.2–1)
WBC URNS QL MICRO: ABNORMAL /HPF (ref 0–4)
WBC URNS QL MICRO: ABNORMAL /HPF (ref 0–4)

## 2023-03-19 PROCEDURE — 81001 URINALYSIS AUTO W/SCOPE: CPT

## 2023-03-19 PROCEDURE — 87086 URINE CULTURE/COLONY COUNT: CPT

## 2023-03-19 PROCEDURE — 99214 OFFICE O/P EST MOD 30 MIN: CPT | Performed by: FAMILY MEDICINE

## 2023-03-19 PROCEDURE — 75810000275 HC EMERGENCY DEPT VISIT NO LEVEL OF CARE

## 2023-03-19 RX ORDER — PHENAZOPYRIDINE HYDROCHLORIDE 200 MG/1
200 TABLET, FILM COATED ORAL
Qty: 6 TABLET | Refills: 0 | Status: SHIPPED | OUTPATIENT
Start: 2023-03-19 | End: 2023-03-21

## 2023-03-19 RX ORDER — CEPHALEXIN 500 MG/1
500 CAPSULE ORAL 4 TIMES DAILY
Qty: 28 CAPSULE | Refills: 0 | Status: SHIPPED | OUTPATIENT
Start: 2023-03-19 | End: 2023-03-26

## 2023-03-19 NOTE — PROGRESS NOTES
0730   Pt arrived with complaints of lower abd pain. More on the left side than right. States that it burns to void and ABD pain gets worse during urination. Pt also complains on decrease fetal movement since this AM.    Patient denies fever, chills, chest pain, vaginal bleeding and discharge. Pt states that she has a fibroid on her left side. 3277 Bedside, Verbal, and Written shift change report given to Carolyn Hare RN (oncoming nurse) by Laz Disla (offgoing nurse). Report included the following information SBAR, Intake/Output, MAR, and Recent Results.

## 2023-03-19 NOTE — H&P
Triage Note    Name: Alex Calderon MRN: [id number]  SSN: [id number]    YOB: 1995  Age: 32 y.o. Sex: female      In-person  used. Patient offered  but preferred to have family member translate for her. Subjective:     Estimated Date of Delivery: 23  OB History          1    Para        Term                AB        Living             SAB        IAB        Ectopic        Molar        Multiple        Live Births                  Ms. Sendy Arce is a 25yo  at 19w5d by L/Sonja who presented with abdominal pain, dysuria. She was seen at outside ER overnight yesterday with similar complaints. Was told if pain persisted to present to hospital with L&D. Pregnancy has been complicated by GERD. Has reported history of uterine fibroids and kidney stones as well as pyelonephritis at age 15. States her symptoms started two days ago with lower pelvic pain, dysuria. Also some pain along right flank. Has had some GERD and had nausea and vomiting once time. Reports being able to tolerate po - food and liquid - without difficulty since that time. Has noticed small amount of vaginal discharge, does not itch or cause pain. Denies any vaginal bleeding. Reports no bowel movement for three days, has been needing to strain with bowel movements. Past Medical History:   Diagnosis Date    Fibroids      No past surgical history on file. Social History     Occupational History    Not on file   Tobacco Use    Smoking status: Never    Smokeless tobacco: Never   Substance and Sexual Activity    Alcohol use: Not Currently    Drug use: Never    Sexual activity: Not on file     Family History   Problem Relation Age of Onset    Breast Cancer Maternal Aunt        No Known Allergies  Prior to Admission medications    Medication Sig Start Date End Date Taking? Authorizing Provider   prenatal vit-iron fumarate-fa 27 mg iron- 0.8 mg tab tablet Take 1 Tablet by mouth daily. 3/19/23  Yes Arelis Knee, DO   cephALEXin (Keflex) 500 mg capsule Take 1 Capsule by mouth four (4) times daily for 7 days. For urinary tract infection 3/19/23 3/26/23 Yes Arelis Knee, DO   phenazopyridine (PYRIDIUM) 200 mg tablet Take 1 Tablet by mouth three (3) times daily as needed (pain with urination) for up to 2 days. 3/19/23 3/21/23 Yes Arelis Knee, DO        Review of Systems: Pertinent items are noted in HPI. Objective:     Vitals:  Vitals:    23 0749   BP: (!) 98/56   Pulse: 80   Resp: 16   Temp: 98.5 °F (36.9 °C)   SpO2: 100%        Physical Exam:  Patient without distress. Heart: Regular rate and rhythm  Lung: clear to auscultation throughout lung fields, no wheezes, no rales, no rhonchi, and normal respiratory effort  Back: costovertebral angle tenderness present on right   Abdomen: soft, mild TTP in suprapubic area and LLQ, no guarding  Fundus: non tender  : normal external genitalia  normal vaginal mucosa  moderate amount of white, milky discharge, no odor  cervix normal in appearance, appears closed and long   FWB: FHT present 145-150    Prenatal Labs:   Lab Results   Component Value Date/Time    Rubella, External non- immune 2023 12:00 AM    HBsAg, External negative 2023 12:00 AM    HIV, External negative 2023 12:00 AM    RPR, External negative 2023 12:00 AM        Assessment/Plan:     26yo  at 19w5d with lower pelvic pain, flank pain and dysuria most consistent with cystitis. Initial two urine specimens collected contaminated with many epithelial cells. Many bacteria but no LE, nitrites. Repeat U/A still with epithelial cells after instruction on obtaining clean catch. Given symptoms and history of pyelo with current flank pain, will treat empirically with Keflex 500mg QID x7days + pyridium prn. Reviewed management of constipation. Remote history of fibroid, I do not see this noted on recent scans.  Recommended patient call primary OB tomorrow for follow-up but less likely cause of symptoms. Reviewed outside ED ultrasound from yesterday - normal IUP with bilateral ovaries with flow, no cysts or fibroids noted. Reviewed return precautions, counseling regarding increase water intake and management of constipation. Patient voiced understanding of plan, all questions answered.      Signed By:  Geri Mckeon DO     March 19, 2023

## 2023-03-19 NOTE — PROGRESS NOTES
[address] [place] report received from Blank Irving RN. Care of PT assumed at this time. 9476 Dr. Keith Barrera at bedside. MD discussing PT urine results and plan of care with PT and family. MD would like a clean catch urine culture obtained. MD plans to send abx to PT pharmacy depending on culture results. 7588 Dr. Keith Barrera performing speculum vaginal exam to evaluate PT discharge. Everything looks normal per MD.     3534 Discharge instructions reviewed with PT and family. PT agrees and has no questions or concerns at this time. UA/culture sent at this time. 9980 This RN calling lab to verify urine culture is drawn off of new clean catch urine just obtained. 6102 PT ambulates off unit at this time.

## 2023-03-19 NOTE — DISCHARGE INSTRUCTIONS
"[employer]harMobileTag Activation    Thank you for requesting access to [email or web address]. Please follow the instructions below to securely access and download your online medical record. [email or web address] allows you to send messages to your doctor, view your test results, renew your prescriptions, schedule appointments, and more. How Do I Sign Up? In your internet browser, go to [email or web address]  Click on the First Time User? Click Here link in the Sign In box. You will be redirect to the New Member Sign Up page. Enter your [email or web address] Access Code exactly as it appears below. You will not need to use this code after youve completed the sign-up process. If you do not sign up before the expiration date, you must request a new code. [email or web address] Access Code: Activation code not generated  Current [email or web address] Status: Active (This is the date your [email or web address] access code will )    Enter the last four digits of your Social Security Number (xxxx) and Date of Birth (mm/dd/yyyy) as indicated and click Submit. You will be taken to the next sign-up page. Create a [email or web address] ID. This will be your [email or web address] login ID and cannot be changed, so think of one that is secure and easy to remember. Create a [email or web address] password. You can change your password at any time. Enter your Password Reset Question and Answer. This can be used at a later time if you forget your password. Enter your e-mail address. You will receive e-mail notification when new information is available in [address] Click Sign Up. You can now view and download portions of your medical record. Click the [place] link to download a portable copy of your medical information. Additional Information    If you have questions, please visit the Frequently Asked Questions section of the [email or web address] website at [email or web address] Remember, [email or web address] is NOT to be used for urgent needs. For medical emergencies, dial 911. Semanas 18 a 22 de powell embarazo:  Instrucciones de cuidado  Weeks 18 to 22 of Your Pregnancy: Care Instructions  En esta etapa, es posible que descubra que las náuseas y la fatiga regan desaparecido. Es posible que se sienta mejor en general y tenga más energía. Kenneth también podría tener algunas molestias nuevas, jennifer problemas para dormir o calambres en las piernas. Es posible que empiece a notar que powell bebé se mueve. Estos movimientos pueden sentirse jennifer mariposas o burbujas. En esta etapa, los bebés pueden chuparse los pulgares. Aurea algo de ejercicio todos los días. Y evite la cafeína al final del día. [place] kevin ducha o un baño tibio antes de acostarse. Pruebe ejercicios de relajación para calmar la mente y el cuerpo. Use almohadas adicionales. Pueden ayudarla a sentirse cómoda. No tome pastillas para dormir ni alcohol. Podrían ser perjudiciales para powell bebé. Para los calambres en las piernas, aurea estiramientos y aplique calor. Un baño tibio, calentadores de piernas, kevin almohadilla térmica o kevin bolsa de [place] pueden ayudar con los garth musculares. Estiramientos para los calambres en las piernas  Estire la pierna y doble el pie (flexione el tobillo) lentamente hacia arriba, hacia la rodilla. Doble los dedos de los pies hacia arriba y [place]. P[place]ngase de pie en kevin superficie plana. Estire los dedos de los pies [place]. Para mantener el equilibrio, apóyese en la pared o en algo estable. Si se siente tong, dé pequeños pasos caminando [employer]. La atención de seguimiento es kevin parte clave de powell tratamiento y seguridad. Asegúrese de hacer y acudir a todas las citas, y llame a powell médico si está teniendo problemas. También es kevin buena idea saber los resultados de michelle exámenes y mantener kevin lista de los medicamentos que vanessa. ¿Dónde puede encontrar más información en inglés?   Savi Kincaid a [email or web address].[email or web address]/  Kaitlin Julien T257 en la búsqueda para aprender más acerca de \"Semanas 18 a 25 de powell embarazo: Instrucciones de cuidado. \"  Revisado: 23 febrero, 2022               Versión del contenido: 13.4  © 8227-9078 Healthwise, Incorporated. Las instrucciones de cuidado fueron adaptadas bajo licencia por Good Help Connections (which disclaims liability or warranty for this information). Si usted tiene [place] afección médica o sobre estas instrucciones, siempre pregunte a powell profesional de danna. Healthwise, Incorporated niega toda garantía o responsabilidad por powell uso de esta información. Dolor abdominal ronna el [place]: Instrucciones de cuidado  Belly Pain in Pregnancy: Care Instructions  Instrucciones de cuidado     Cuando está embarazada, cualquier dolor abdominal puede ser kevin preocupación. Es posible que no Jewelene Cart a powell médico por cada dolor que tenga. Kenneth usted no quiere pasar por alto nada que sea peligroso para usted o powell bebé. Incluso si se siente jennifer algo conocido, un dolor abdominal puede significar algo nuevo cuando está embarazada. Es importante saber cuándo llamar al médico. Brittnee Isles será útil saber cómo cuidarse en el hogar cuando powell dolor no está causado por nada perjudicial.  Cuando el dolor abdominal es más intenso o asad, landy a un médico inmediatamente. Si está bergeron de que powell dolor abdominal es kevin señal del [place], llame a powell médico.  Cuando el dolor abdominal es breve, generalmente es kevin parte normal del [place]. Puede estar relacionado con cambios en el útero que está creciendo. O puede deberse al estiramiento de los ligamentos llamados ligamentos redondos. Estos ligamentos ayudan a proporcionar soporte al [employer]. El dolor de los ligamentos redondos puede presentarse en cualquiera de los dos lados del abdomen. También es posible que lo sienta en las caderas o en la janene. La atención de seguimiento es kevin parte clave de powell tratamiento y seguridad.  Asegúrese de hacer y acudir a todas las citas, y llame a powell médico si está teniendo problemas. También es kevin buena idea saber los resultados de michelle exámenes y mantener kevin lista de los medicamentos que vanessa. ¿Cómo puede saber si powell dolor abdominal es kevin señal del [facility] [place]? Cuando el dolor abdominal está causado por el [place] de [place], puede sentirse jennifer cólicos leves o similares a los menstruales en la parte inferior del abdomen. Estos cólicos probablemente candice contracciones. Pueden suceder en el je o tercer trimestre. También podría tener:  Un dolor persistente y sordo en la parte baja de la espalda, en la pelvis o en los muslos. Kevin sensación de presión en la pelvis o en la parte baja del abdomen. Cambios en la secreción vaginal o kevin descarga repentina de líquido de la vagina. Si piensa que está en [facility] [place], llame a powell médico.  ¿Cómo puede cuidarse en el hogar? Cuando el dolor abdominal es leve y no es un síntoma del [place] de parto:  Descanse hasta que se sienta mejor. Dese un baño de agua tibia. Piense en lo que come y susy:  Tammy mucho líquido. Opte por beber agua y otros líquidos krystyna hasta que se sienta mejor. Pruebe a comer comidas pequeñas y frecuentes. Si tiene [employer], pruebe alimentos suaves y bajos en grasa, jennifer arroz sin condimentar, marito asado, pan rio y yogur. Piense en cómo se mueve si está teniendo garth breves debido al estiramiento de los ligamentos redondos. [place] de estiramiento suaves. Muévase un poco más despacio cuando se dé la vuelta en la cama o cuando se levante de kevin silla, para que esos ligamentos no se estiren rápidamente. Inclínese un poco hacia adelante si rachel que va a toser o a estornudar. ¿Cuándo debe pedir ayuda? Llame al 911  en cualquier momento que considere que necesita atención de [place]. Por ejemplo, llame si:    Tiene dolor repentino e intenso en el abdomen. Tiene sangrado vaginal intenso. Se desmayó (perdió el conocimiento). Tiene convulsiones.    Llame a powell médico ahora mismo o busque atención médica inmediata si:    Tiene dolor nuevo en el abdomen o rebecca empeora, o tiene retortijones. Tiene cualquier tipo de sangrado vaginal.     Yonny Bailey. Tiene síntomas de preeclampsia, jennifer:  Hinchazón repentina de la cheryle, las manuel o los pies. Nuevos problemas de visión (jennifer oscurecimiento, hannah borroso o hannah puntos). Dolor de meghna intenso. Piensa que puede sada comenzado el [place]. [place] significa que ha tenido al menos 8 contracciones dentro de 1 hora o al menos 4 contracciones dentro de 20 minutos, incluso después de que cambia de posición y susy líquidos. Tiene síntomas de kevin infección urinaria. Estos pueden incluir:  Dolor o ardor al [place]. Necesidad de orinar con frecuencia sin poder eliminar mucha orina. Dolor en el flanco, el cual se siente dinah debajo de la caja torácica y por encima de la cintura en cualquiera de los lados de la espalda. Navene en la orina. Preste especial atención a los cambios en powell danna y asegúrese de comunicarse con powell médico si está preocupada por powell danna o la de powell bebé. ¿Dónde puede encontrar más información en inglés? Vaya a [email or web address].gray.com/  Escriba B275 en la búsqueda para aprender más acerca de \"Dolor abdominal ronna el embarazo: Instrucciones de cuidado. \"  Revisado: 23 febrero, 2022               Versión del contenido: 13.4  © 0343-8971 Healthwise, Incorporated. Las instrucciones de cuidado fueron adaptadas bajo licencia por Good Help Connections (which disclaims liability or warranty for this information). Si usted tiene [place] afección médica o sobre estas instrucciones, siempre pregunte a powell profesional de danna. [facility], [facility]orporated niega toda garantía o responsabilidad por powell uso de esta información.          Aprenda cuándo llamar a powell médico ronna el embarazo (después de 20 semanas)  Learning About When to Call Your Doctor During Pregnancy (After 20 Weeks)  Instrucciones de cuidado  Es normal que tenga inquietudes acerca de lo que podría ser un problema ronna el embarazo. Aunque la mayoría de las mujeres embarazadas no tienen ningún problema grave, es importante saber cuándo llamar a powell médico si tiene determinados síntomas o señales de trabajo de [place]. Estas son algunas sugerencias generales. Powell médico puede darle más información sobre cuándo llamar. Cuándo llamar a powell médico (después de 20 semanas)  Llame al 911  en cualquier momento que considere que necesita atención de [place]. Por ejemplo, llame si:  Tiene sangrado vaginal intenso. Tiene dolor repentino e intenso en el abdomen. Se desmayó (perdió el conocimiento). Tiene kevin convulsión. Ve o siente el cordón umbilical.  Kathy que está a punto de randee a latrell a powell bebé y no puede llegar en forma bergeron al hospital.  Nando Rockers a powell médico ahora mismo o busque atención médica inmediata si:  Tiene sangrado vaginal.  Tiene dolor en el abdomen. Tiene fiebre. Tiene síntomas de preeclampsia, jennifer:  Hinchazón repentina de la cheryle, las manuel o los pies. Nuevos problemas de visión (jennifer oscurecimiento, hannah borroso o hannah puntos). Dolor de meghna intenso. Tiene kevin pérdida repentina de líquido por la vagina. (Piensa que rompió la vinayak). Piensa que puede sada comenzado el [place]. [place] significa que ha tenido al menos 6 contracciones en [employer]. Nota que powell bebé ha dejado de moverse o lo hace mucho menos de lo habitual.  Tiene síntomas de kevin infección urinaria. Estos pueden incluir:  Dolor o ardor al [place]. Necesidad de orinar con frecuencia sin poder eliminar mucha orina. Dolor en el flanco, que se encuentra dinah debajo de la caja torácica y [place] de la cintura en un lado de la espalda. Naveen en la orina. Preste especial atención a los cambios en powell danna y asegúrese de comunicarse con powell médico si:  Tiene flujo vaginal con un olor desagradable.   Tiene cambios en la piel, tales jennifer:  Salpullido. Comezón. Color amarillento en la piel. Tiene otras inquietudes acerca de powell [place]. Si tiene signos de trabajo de parto al llegar a las [address] o más  Si tiene señales de [place] de parto a las 37 semanas o [place], es posible que powell médico le diga que llame cuando powell trabajo de parto se vuelva más [place]. Los síntomas del trabajo de parto activo incluyen:  Contracciones que son regulares. Contracciones a intervalos de menos de 5 minutos. Contracciones ronna las cuales es difícil hablar. La atención de seguimiento es kevin parte clave de powell tratamiento y seguridad. Asegúrese de hacer y acudir a todas las citas, y llame a powell médico si está teniendo problemas. También es kevin buena idea saber los resultados de michelle exámenes y mantener kevin lista de los medicamentos que vanessa. ¿Dónde puede encontrar más información en inglés? Vaya a http://www.gray.com/  [place] [id number] en la búsqueda para aprender más acerca de \"Aprenda cuándo llamar a powell médico ronna el embarazo (después de 20 semanas). \"  Revisado: 23 febrero, 2022               Versión del contenido: 13.4  © 4691-1719 Healthwise, Incorporated. Las instrucciones de cuidado fueron adaptadas bajo licencia por [employer] (which disclaims liability or warranty for this information). Si usted tiene [place] afección médica o sobre estas instrucciones, siempre pregunte a powell profesional de danna. Healthwise, Incorporated niega toda garantía o responsabilidad por powell uso de esta información.

## 2023-03-20 ENCOUNTER — ROUTINE PRENATAL (OUTPATIENT)
Dept: OBGYN CLINIC | Age: 28
End: 2023-03-20
Payer: MEDICAID

## 2023-03-20 VITALS — SYSTOLIC BLOOD PRESSURE: 93 MMHG | WEIGHT: 118 LBS | DIASTOLIC BLOOD PRESSURE: 61 MMHG | BODY MASS INDEX: 24.11 KG/M2

## 2023-03-20 DIAGNOSIS — Z34.00 ENCOUNTER FOR SUPERVISION OF NORMAL FIRST PREGNANCY, UNSPECIFIED TRIMESTER: Primary | ICD-10-CM

## 2023-03-20 DIAGNOSIS — O26.892 PELVIC PAIN AFFECTING PREGNANCY IN SECOND TRIMESTER, ANTEPARTUM: ICD-10-CM

## 2023-03-20 DIAGNOSIS — R10.2 PELVIC PAIN AFFECTING PREGNANCY IN SECOND TRIMESTER, ANTEPARTUM: ICD-10-CM

## 2023-03-20 LAB
BACTERIA SPEC CULT: NORMAL
SERVICE CMNT-IMP: NORMAL

## 2023-03-20 PROCEDURE — 0502F SUBSEQUENT PRENATAL CARE: CPT | Performed by: OBSTETRICS & GYNECOLOGY

## 2023-03-20 RX ORDER — OXYCODONE AND ACETAMINOPHEN 5; 325 MG/1; MG/1
1 TABLET ORAL
Qty: 24 TABLET | Refills: 0 | Status: SHIPPED | OUTPATIENT
Start: 2023-03-20 | End: 2023-04-03

## 2023-03-22 ENCOUNTER — ROUTINE PRENATAL (OUTPATIENT)
Dept: OBGYN CLINIC | Age: 28
End: 2023-03-22

## 2023-03-22 VITALS — SYSTOLIC BLOOD PRESSURE: 93 MMHG | DIASTOLIC BLOOD PRESSURE: 56 MMHG | BODY MASS INDEX: 24.19 KG/M2 | WEIGHT: 118.4 LBS

## 2023-03-22 DIAGNOSIS — R10.2 PELVIC PAIN AFFECTING PREGNANCY IN SECOND TRIMESTER, ANTEPARTUM: ICD-10-CM

## 2023-03-22 DIAGNOSIS — Z34.00 ENCOUNTER FOR SUPERVISION OF NORMAL FIRST PREGNANCY, UNSPECIFIED TRIMESTER: Primary | ICD-10-CM

## 2023-03-22 DIAGNOSIS — O26.892 PELVIC PAIN AFFECTING PREGNANCY IN SECOND TRIMESTER, ANTEPARTUM: ICD-10-CM

## 2023-03-22 PROCEDURE — 0502F SUBSEQUENT PRENATAL CARE: CPT | Performed by: OBSTETRICS & GYNECOLOGY

## 2023-03-22 NOTE — PROGRESS NOTES
FETAL SURVEY  A SINGLE VIABLE IUP AT 20W1D GA BY LMP IS SEEN. FETAL CARDIAC MOTION OBSERVED. FETAL ANATOMY WELL VISUALIZED AND APPEARS WITHIN NORMAL LIMITS. NO ABNORMALITIES ARE SEEN ON TODAY'S EXAM.  APPROPRIATE FETAL GROWTH IS SEEN. SIZE=DATES. GUIDO, CERVIX AND PLACENTA APPEAR WITHIN NORMAL LIMITS. THERE APPEARS TO BE A FIBROID IN THE ATTILA THAT IS ANTERIOR AND ADJACENT TO THE CERVIX MEASURING 60  X 56 X 81MM. GENDER: MALE. PT DOES NOT KNOW.

## 2023-03-22 NOTE — PROGRESS NOTES
US looks good.   Fibroid slightly larger  Still with discomfort but better, especially with one pain pill she took

## 2023-04-19 ENCOUNTER — ROUTINE PRENATAL (OUTPATIENT)
Dept: OBGYN CLINIC | Age: 28
End: 2023-04-19
Payer: MEDICAID

## 2023-04-19 VITALS — DIASTOLIC BLOOD PRESSURE: 67 MMHG | BODY MASS INDEX: 25.34 KG/M2 | WEIGHT: 124 LBS | SYSTOLIC BLOOD PRESSURE: 102 MMHG

## 2023-04-19 DIAGNOSIS — Z34.00 ENCOUNTER FOR SUPERVISION OF NORMAL FIRST PREGNANCY, UNSPECIFIED TRIMESTER: Primary | ICD-10-CM

## 2023-04-19 PROCEDURE — 0502F SUBSEQUENT PRENATAL CARE: CPT | Performed by: OBSTETRICS & GYNECOLOGY

## 2023-05-17 ENCOUNTER — ROUTINE PRENATAL (OUTPATIENT)
Age: 28
End: 2023-05-17
Payer: MEDICAID

## 2023-05-17 VITALS — SYSTOLIC BLOOD PRESSURE: 92 MMHG | WEIGHT: 132.4 LBS | BODY MASS INDEX: 27.05 KG/M2 | DIASTOLIC BLOOD PRESSURE: 59 MMHG

## 2023-05-17 DIAGNOSIS — Z34.00 ENCOUNTER FOR SUPERVISION PREGNANCY IN PRIMIGRAVIDA, ANTEPARTUM: Primary | ICD-10-CM

## 2023-05-17 DIAGNOSIS — Z23 ENCOUNTER FOR IMMUNIZATION: ICD-10-CM

## 2023-05-17 PROCEDURE — 90471 IMMUNIZATION ADMIN: CPT | Performed by: OBSTETRICS & GYNECOLOGY

## 2023-05-17 PROCEDURE — 0502F SUBSEQUENT PRENATAL CARE: CPT | Performed by: OBSTETRICS & GYNECOLOGY

## 2023-05-17 PROCEDURE — 90715 TDAP VACCINE 7 YRS/> IM: CPT | Performed by: OBSTETRICS & GYNECOLOGY

## 2023-05-18 LAB
BASOPHILS # BLD AUTO: 0.1 X10E3/UL (ref 0–0.2)
BASOPHILS NFR BLD AUTO: 1 %
EOSINOPHIL # BLD AUTO: 0.2 X10E3/UL (ref 0–0.4)
EOSINOPHIL NFR BLD AUTO: 2 %
ERYTHROCYTE [DISTWIDTH] IN BLOOD BY AUTOMATED COUNT: 13.4 % (ref 11.7–15.4)
GLUCOSE 1H P 50 G GLC PO SERPL-MCNC: 190 MG/DL (ref 70–139)
HCT VFR BLD AUTO: 30.7 % (ref 34–46.6)
HGB BLD-MCNC: 10.4 G/DL (ref 11.1–15.9)
IMM GRANULOCYTES # BLD AUTO: 0.7 X10E3/UL (ref 0–0.1)
IMM GRANULOCYTES NFR BLD AUTO: 5 %
LYMPHOCYTES # BLD AUTO: 2.9 X10E3/UL (ref 0.7–3.1)
LYMPHOCYTES NFR BLD AUTO: 22 %
MCH RBC QN AUTO: 29.2 PG (ref 26.6–33)
MCHC RBC AUTO-ENTMCNC: 33.9 G/DL (ref 31.5–35.7)
MCV RBC AUTO: 86 FL (ref 79–97)
MONOCYTES # BLD AUTO: 1.2 X10E3/UL (ref 0.1–0.9)
MONOCYTES NFR BLD AUTO: 9 %
NEUTROPHILS # BLD AUTO: 8.1 X10E3/UL (ref 1.4–7)
NEUTROPHILS NFR BLD AUTO: 61 %
PLATELET # BLD AUTO: 263 X10E3/UL (ref 150–450)
RBC # BLD AUTO: 3.56 X10E6/UL (ref 3.77–5.28)
SPECIMEN STATUS REPORT: NORMAL
WBC # BLD AUTO: 13.3 X10E3/UL (ref 3.4–10.8)

## 2023-06-05 ENCOUNTER — NURSE ONLY (OUTPATIENT)
Age: 28
End: 2023-06-05

## 2023-06-05 DIAGNOSIS — Z34.00 ENCOUNTER FOR SUPERVISION PREGNANCY IN PRIMIGRAVIDA, ANTEPARTUM: ICD-10-CM

## 2023-06-05 DIAGNOSIS — Z23 ENCOUNTER FOR IMMUNIZATION: Primary | ICD-10-CM

## 2023-06-14 PROBLEM — Z34.00 ENCOUNTER FOR SUPERVISION OF NORMAL FIRST PREGNANCY, UNSPECIFIED TRIMESTER: Status: ACTIVE | Noted: 2023-01-06

## 2023-06-22 ENCOUNTER — ROUTINE PRENATAL (OUTPATIENT)
Age: 28
End: 2023-06-22

## 2023-06-22 VITALS — BODY MASS INDEX: 26.89 KG/M2 | WEIGHT: 131.6 LBS | DIASTOLIC BLOOD PRESSURE: 58 MMHG | SYSTOLIC BLOOD PRESSURE: 92 MMHG

## 2023-06-22 DIAGNOSIS — Z34.00 ENCOUNTER FOR SUPERVISION OF NORMAL FIRST PREGNANCY, UNSPECIFIED TRIMESTER: Primary | ICD-10-CM

## 2023-06-22 DIAGNOSIS — O24.419 GESTATIONAL DIABETES MELLITUS (GDM) IN THIRD TRIMESTER, GESTATIONAL DIABETES METHOD OF CONTROL UNSPECIFIED: ICD-10-CM

## 2023-06-22 NOTE — PROGRESS NOTES
Could not tolerate Metformin  Strictly avoiding carbs and eating protein  Will get diabetes teaching and check HgbA1c at next appt

## 2023-06-26 ENCOUNTER — HOSPITAL ENCOUNTER (OUTPATIENT)
Facility: HOSPITAL | Age: 28
Discharge: HOME | End: 2023-06-29
Payer: MEDICAID

## 2023-06-26 DIAGNOSIS — O24.419 GDM (GESTATIONAL DIABETES MELLITUS): ICD-10-CM

## 2023-06-26 PROCEDURE — 76811 OB US DETAILED SNGL FETUS: CPT

## 2023-06-26 PROCEDURE — 76819 FETAL BIOPHYS PROFIL W/O NST: CPT

## 2023-06-26 RX ORDER — BLOOD-GLUCOSE METER
EACH MISCELLANEOUS
Qty: 1 KIT | Refills: 0 | OUTPATIENT
Start: 2023-06-26

## 2023-06-26 RX ORDER — PEN NEEDLE, DIABETIC 31 GX5/16"
NEEDLE, DISPOSABLE MISCELLANEOUS
Qty: 200 EACH | Refills: 3 | OUTPATIENT
Start: 2023-06-26

## 2023-06-26 RX ORDER — LANCETS 30 GAUGE
EACH MISCELLANEOUS
Qty: 200 EACH | Refills: 3 | OUTPATIENT
Start: 2023-06-26

## 2023-06-26 RX ORDER — GLUCOSAMINE HCL/CHONDROITIN SU 500-400 MG
CAPSULE ORAL
Qty: 200 STRIP | Refills: 3 | OUTPATIENT
Start: 2023-06-26

## 2023-06-28 ENCOUNTER — NURSE ONLY (OUTPATIENT)
Age: 28
End: 2023-06-28

## 2023-06-28 DIAGNOSIS — O24.419 GESTATIONAL DIABETES MELLITUS (GDM) IN THIRD TRIMESTER, GESTATIONAL DIABETES METHOD OF CONTROL UNSPECIFIED: Primary | ICD-10-CM

## 2023-06-30 ENCOUNTER — TELEPHONE (OUTPATIENT)
Age: 28
End: 2023-06-30

## 2023-06-30 RX ORDER — BLOOD-GLUCOSE METER
EACH MISCELLANEOUS
Qty: 1 KIT | Refills: 0 | Status: SHIPPED | OUTPATIENT
Start: 2023-06-30

## 2023-06-30 RX ORDER — LANCETS 30 GAUGE
EACH MISCELLANEOUS
Qty: 200 EACH | Refills: 3 | Status: SHIPPED | OUTPATIENT
Start: 2023-06-30

## 2023-06-30 RX ORDER — PEN NEEDLE, DIABETIC 31 GX5/16"
NEEDLE, DISPOSABLE MISCELLANEOUS
Qty: 200 EACH | Refills: 3 | Status: SHIPPED | OUTPATIENT
Start: 2023-06-30

## 2023-06-30 RX ORDER — GLUCOSAMINE HCL/CHONDROITIN SU 500-400 MG
CAPSULE ORAL
Qty: 200 STRIP | Refills: 3 | Status: SHIPPED | OUTPATIENT
Start: 2023-06-30

## 2023-07-07 ENCOUNTER — ROUTINE PRENATAL (OUTPATIENT)
Age: 28
End: 2023-07-07

## 2023-07-07 ENCOUNTER — HOSPITAL ENCOUNTER (OUTPATIENT)
Facility: HOSPITAL | Age: 28
Discharge: HOME | End: 2023-07-10
Payer: MEDICAID

## 2023-07-07 VITALS — BODY MASS INDEX: 27.09 KG/M2 | DIASTOLIC BLOOD PRESSURE: 54 MMHG | WEIGHT: 132.6 LBS | SYSTOLIC BLOOD PRESSURE: 92 MMHG

## 2023-07-07 DIAGNOSIS — Z34.00 ENCOUNTER FOR SUPERVISION OF NORMAL FIRST PREGNANCY, UNSPECIFIED TRIMESTER: Primary | ICD-10-CM

## 2023-07-07 PROCEDURE — 76819 FETAL BIOPHYS PROFIL W/O NST: CPT

## 2023-07-11 ENCOUNTER — TELEPHONE (OUTPATIENT)
Age: 28
End: 2023-07-11

## 2023-07-11 NOTE — TELEPHONE ENCOUNTER
Pt's cousin Prachi Angeles called to report her cousin (pt) called her to report she was feeling worried because of pink spotting. Advised I would call the pt using  services.  ID: Sav Pool [id number]   Called pt & she reported noticing pink spotting this morning, then around 12:20pm she noticed vaginal discharge that had brown mixed in, jelly consistency. Pt denies fluid trickling. Pt reports stomach tightening earlier this morning, then noticed 1 time that felt like a period cramp. Pt reports not feeling 5 movements q hour, but not decreased from usual, pt felt a FM when we were speaking on the phone. Consulted with Dr. Yoly John & he advised to tell pt labor precautions.  ID [id number]   Called pt back with recommendations & labor precautions, pt will call back if she notices any other sx.

## 2023-07-12 ENCOUNTER — HOSPITAL ENCOUNTER (INPATIENT)
Facility: HOSPITAL | Age: 28
LOS: 3 days | Discharge: HOME | DRG: 560 | End: 2023-07-15
Attending: OBSTETRICS & GYNECOLOGY | Admitting: ADVANCED PRACTICE MIDWIFE
Payer: MEDICAID

## 2023-07-12 ENCOUNTER — ANESTHESIA (OUTPATIENT)
Facility: HOSPITAL | Age: 28
DRG: 560 | End: 2023-07-12
Payer: MEDICAID

## 2023-07-12 ENCOUNTER — ANESTHESIA EVENT (OUTPATIENT)
Facility: HOSPITAL | Age: 28
DRG: 560 | End: 2023-07-12
Payer: MEDICAID

## 2023-07-12 PROBLEM — O42.90 PROM (PREMATURE RUPTURE OF MEMBRANES): Status: ACTIVE | Noted: 2023-07-12

## 2023-07-12 LAB
ABO + RH BLD: NORMAL
ALBUMIN SERPL-MCNC: 2.7 G/DL (ref 3.5–5)
ALBUMIN/GLOB SERPL: 0.8 (ref 1.1–2.2)
ALP SERPL-CCNC: 160 U/L (ref 45–117)
ALT SERPL-CCNC: 21 U/L (ref 12–78)
ANION GAP SERPL CALC-SCNC: 5 MMOL/L (ref 5–15)
AST SERPL-CCNC: 22 U/L (ref 15–37)
BILIRUB SERPL-MCNC: 0.3 MG/DL (ref 0.2–1)
BLOOD GROUP ANTIBODIES SERPL: NORMAL
BUN SERPL-MCNC: 8 MG/DL (ref 6–20)
BUN/CREAT SERPL: 12 (ref 12–20)
CALCIUM SERPL-MCNC: 9.2 MG/DL (ref 8.5–10.1)
CHLORIDE SERPL-SCNC: 111 MMOL/L (ref 97–108)
CO2 SERPL-SCNC: 24 MMOL/L (ref 21–32)
CREAT SERPL-MCNC: 0.67 MG/DL (ref 0.55–1.02)
ERYTHROCYTE [DISTWIDTH] IN BLOOD BY AUTOMATED COUNT: 15.3 % (ref 11.5–14.5)
GLOBULIN SER CALC-MCNC: 3.4 G/DL (ref 2–4)
GLUCOSE BLD STRIP.AUTO-MCNC: 110 MG/DL (ref 65–117)
GLUCOSE BLD STRIP.AUTO-MCNC: 115 MG/DL (ref 65–117)
GLUCOSE BLD STRIP.AUTO-MCNC: 141 MG/DL (ref 65–117)
GLUCOSE BLD STRIP.AUTO-MCNC: 76 MG/DL (ref 65–117)
GLUCOSE BLD STRIP.AUTO-MCNC: 85 MG/DL (ref 65–117)
GLUCOSE BLD STRIP.AUTO-MCNC: 92 MG/DL (ref 65–117)
GLUCOSE SERPL-MCNC: 78 MG/DL (ref 65–100)
HCT VFR BLD AUTO: 34.6 % (ref 35–47)
HGB BLD-MCNC: 11.4 G/DL (ref 11.5–16)
MCH RBC QN AUTO: 28.1 PG (ref 26–34)
MCHC RBC AUTO-ENTMCNC: 32.9 G/DL (ref 30–36.5)
MCV RBC AUTO: 85.4 FL (ref 80–99)
NRBC # BLD: 0 K/UL (ref 0–0.01)
NRBC BLD-RTO: 0 PER 100 WBC
PLATELET # BLD AUTO: 233 K/UL (ref 150–400)
PMV BLD AUTO: 9.4 FL (ref 8.9–12.9)
POTASSIUM SERPL-SCNC: 4.1 MMOL/L (ref 3.5–5.1)
PROT SERPL-MCNC: 6.1 G/DL (ref 6.4–8.2)
RBC # BLD AUTO: 4.05 M/UL (ref 3.8–5.2)
SERVICE CMNT-IMP: ABNORMAL
SERVICE CMNT-IMP: NORMAL
SODIUM SERPL-SCNC: 140 MMOL/L (ref 136–145)
SPECIMEN EXP DATE BLD: NORMAL
WBC # BLD AUTO: 8.8 K/UL (ref 3.6–11)

## 2023-07-12 PROCEDURE — 2580000003 HC RX 258: Performed by: ADVANCED PRACTICE MIDWIFE

## 2023-07-12 PROCEDURE — 82962 GLUCOSE BLOOD TEST: CPT

## 2023-07-12 PROCEDURE — 86850 RBC ANTIBODY SCREEN: CPT

## 2023-07-12 PROCEDURE — 00HU33Z INSERTION OF INFUSION DEVICE INTO SPINAL CANAL, PERCUTANEOUS APPROACH: ICD-10-PCS | Performed by: NURSE ANESTHETIST, CERTIFIED REGISTERED

## 2023-07-12 PROCEDURE — 85027 COMPLETE CBC AUTOMATED: CPT

## 2023-07-12 PROCEDURE — 94761 N-INVAS EAR/PLS OXIMETRY MLT: CPT

## 2023-07-12 PROCEDURE — 1100000000 HC RM PRIVATE

## 2023-07-12 PROCEDURE — 36415 COLL VENOUS BLD VENIPUNCTURE: CPT

## 2023-07-12 PROCEDURE — 86901 BLOOD TYPING SEROLOGIC RH(D): CPT

## 2023-07-12 PROCEDURE — 2500000003 HC RX 250 WO HCPCS

## 2023-07-12 PROCEDURE — 3700000025 EPIDURAL BLOCK: Performed by: ANESTHESIOLOGY

## 2023-07-12 PROCEDURE — 2500000003 HC RX 250 WO HCPCS: Performed by: ANESTHESIOLOGY

## 2023-07-12 PROCEDURE — 2500000003 HC RX 250 WO HCPCS: Performed by: NURSE ANESTHETIST, CERTIFIED REGISTERED

## 2023-07-12 PROCEDURE — 80053 COMPREHEN METABOLIC PANEL: CPT

## 2023-07-12 PROCEDURE — 7210000100 HC LABOR FEE PER 1 HR: Performed by: ADVANCED PRACTICE MIDWIFE

## 2023-07-12 PROCEDURE — 6360000002 HC RX W HCPCS: Performed by: ADVANCED PRACTICE MIDWIFE

## 2023-07-12 PROCEDURE — 51702 INSERT TEMP BLADDER CATH: CPT

## 2023-07-12 PROCEDURE — 86900 BLOOD TYPING SEROLOGIC ABO: CPT

## 2023-07-12 PROCEDURE — 4500000002 HC ER NO CHARGE

## 2023-07-12 PROCEDURE — 6360000002 HC RX W HCPCS: Performed by: NURSE ANESTHETIST, CERTIFIED REGISTERED

## 2023-07-12 RX ORDER — DOCUSATE SODIUM 100 MG/1
100 CAPSULE, LIQUID FILLED ORAL 2 TIMES DAILY
Status: DISCONTINUED | OUTPATIENT
Start: 2023-07-12 | End: 2023-07-13 | Stop reason: SDUPTHER

## 2023-07-12 RX ORDER — BUPIVACAINE HYDROCHLORIDE 2.5 MG/ML
INJECTION, SOLUTION EPIDURAL; INFILTRATION; INTRACAUDAL PRN
Status: DISCONTINUED | OUTPATIENT
Start: 2023-07-12 | End: 2023-07-13 | Stop reason: SDUPTHER

## 2023-07-12 RX ORDER — METHYLERGONOVINE MALEATE 0.2 MG/ML
200 INJECTION INTRAVENOUS PRN
Status: DISCONTINUED | OUTPATIENT
Start: 2023-07-12 | End: 2023-07-13 | Stop reason: SDUPTHER

## 2023-07-12 RX ORDER — SODIUM CHLORIDE 0.9 % (FLUSH) 0.9 %
5-40 SYRINGE (ML) INJECTION EVERY 12 HOURS SCHEDULED
Status: DISCONTINUED | OUTPATIENT
Start: 2023-07-12 | End: 2023-07-15 | Stop reason: HOSPADM

## 2023-07-12 RX ORDER — TRANEXAMIC ACID 10 MG/ML
1000 INJECTION, SOLUTION INTRAVENOUS
Status: ACTIVE | OUTPATIENT
Start: 2023-07-12 | End: 2023-07-13

## 2023-07-12 RX ORDER — ONDANSETRON 2 MG/ML
4 INJECTION INTRAMUSCULAR; INTRAVENOUS EVERY 6 HOURS PRN
Status: DISCONTINUED | OUTPATIENT
Start: 2023-07-12 | End: 2023-07-15 | Stop reason: HOSPADM

## 2023-07-12 RX ORDER — CARBOPROST TROMETHAMINE 250 UG/ML
250 INJECTION, SOLUTION INTRAMUSCULAR PRN
Status: DISCONTINUED | OUTPATIENT
Start: 2023-07-12 | End: 2023-07-15 | Stop reason: HOSPADM

## 2023-07-12 RX ORDER — MAGNESIUM CARB/ALUMINUM HYDROX 105-160MG
30 TABLET,CHEWABLE ORAL DAILY PRN
Status: DISCONTINUED | OUTPATIENT
Start: 2023-07-12 | End: 2023-07-15 | Stop reason: HOSPADM

## 2023-07-12 RX ORDER — FENTANYL CITRATE 50 UG/ML
100 INJECTION, SOLUTION INTRAMUSCULAR; INTRAVENOUS
Status: DISCONTINUED | OUTPATIENT
Start: 2023-07-12 | End: 2023-07-13

## 2023-07-12 RX ORDER — SODIUM CHLORIDE, SODIUM LACTATE, POTASSIUM CHLORIDE, AND CALCIUM CHLORIDE .6; .31; .03; .02 G/100ML; G/100ML; G/100ML; G/100ML
500 INJECTION, SOLUTION INTRAVENOUS PRN
Status: DISCONTINUED | OUTPATIENT
Start: 2023-07-12 | End: 2023-07-15 | Stop reason: HOSPADM

## 2023-07-12 RX ORDER — SODIUM CHLORIDE, SODIUM LACTATE, POTASSIUM CHLORIDE, CALCIUM CHLORIDE 600; 310; 30; 20 MG/100ML; MG/100ML; MG/100ML; MG/100ML
INJECTION, SOLUTION INTRAVENOUS CONTINUOUS
Status: DISCONTINUED | OUTPATIENT
Start: 2023-07-12 | End: 2023-07-13 | Stop reason: SDUPTHER

## 2023-07-12 RX ORDER — SODIUM CHLORIDE 0.9 % (FLUSH) 0.9 %
5-40 SYRINGE (ML) INJECTION PRN
Status: DISCONTINUED | OUTPATIENT
Start: 2023-07-12 | End: 2023-07-15 | Stop reason: HOSPADM

## 2023-07-12 RX ORDER — FENTANYL 0.2 MG/100ML-BUPIV 0.125%-NACL 0.9% EPIDURAL INJ 2/0.125 MCG/ML-%
10 SOLUTION INJECTION CONTINUOUS
Status: DISCONTINUED | OUTPATIENT
Start: 2023-07-12 | End: 2023-07-14

## 2023-07-12 RX ORDER — SODIUM CHLORIDE, SODIUM LACTATE, POTASSIUM CHLORIDE, AND CALCIUM CHLORIDE .6; .31; .03; .02 G/100ML; G/100ML; G/100ML; G/100ML
1000 INJECTION, SOLUTION INTRAVENOUS PRN
Status: DISCONTINUED | OUTPATIENT
Start: 2023-07-12 | End: 2023-07-15 | Stop reason: HOSPADM

## 2023-07-12 RX ORDER — NALOXONE HYDROCHLORIDE 0.4 MG/ML
INJECTION, SOLUTION INTRAMUSCULAR; INTRAVENOUS; SUBCUTANEOUS PRN
Status: DISCONTINUED | OUTPATIENT
Start: 2023-07-12 | End: 2023-07-14

## 2023-07-12 RX ORDER — EPHEDRINE SULFATE/0.9% NACL/PF 50 MG/5 ML
SYRINGE (ML) INTRAVENOUS
Status: COMPLETED
Start: 2023-07-12 | End: 2023-07-12

## 2023-07-12 RX ORDER — MISOPROSTOL 200 UG/1
800 TABLET ORAL PRN
Status: DISCONTINUED | OUTPATIENT
Start: 2023-07-12 | End: 2023-07-13 | Stop reason: SDUPTHER

## 2023-07-12 RX ORDER — SODIUM CHLORIDE 9 MG/ML
25 INJECTION, SOLUTION INTRAVENOUS PRN
Status: DISCONTINUED | OUTPATIENT
Start: 2023-07-12 | End: 2023-07-15 | Stop reason: HOSPADM

## 2023-07-12 RX ADMIN — SODIUM CHLORIDE, PRESERVATIVE FREE 10 ML: 5 INJECTION INTRAVENOUS at 08:01

## 2023-07-12 RX ADMIN — SODIUM CHLORIDE, POTASSIUM CHLORIDE, SODIUM LACTATE AND CALCIUM CHLORIDE: 600; 310; 30; 20 INJECTION, SOLUTION INTRAVENOUS at 20:00

## 2023-07-12 RX ADMIN — PENICILLIN G POTASSIUM 2.5 MILLION UNITS: 20000000 INJECTION, POWDER, FOR SOLUTION INTRAVENOUS at 16:36

## 2023-07-12 RX ADMIN — LIDOCAINE HYDROCHLORIDE 3 ML: 10; .005 INJECTION, SOLUTION EPIDURAL; INFILTRATION; INTRACAUDAL; PERINEURAL at 15:20

## 2023-07-12 RX ADMIN — ONDANSETRON 4 MG: 2 INJECTION INTRAMUSCULAR; INTRAVENOUS at 20:11

## 2023-07-12 RX ADMIN — PENICILLIN G POTASSIUM 2.5 MILLION UNITS: 20000000 INJECTION, POWDER, FOR SOLUTION INTRAVENOUS at 20:11

## 2023-07-12 RX ADMIN — BUPIVACAINE HYDROCHLORIDE 4 ML: 2.5 INJECTION, SOLUTION EPIDURAL; INFILTRATION; INTRACAUDAL; PERINEURAL at 15:18

## 2023-07-12 RX ADMIN — OXYTOCIN 2 MILLI-UNITS/MIN: 10 INJECTION, SOLUTION INTRAMUSCULAR; INTRAVENOUS at 23:23

## 2023-07-12 RX ADMIN — PENICILLIN G POTASSIUM 2.5 MILLION UNITS: 20000000 INJECTION, POWDER, FOR SOLUTION INTRAVENOUS at 08:00

## 2023-07-12 RX ADMIN — SODIUM CHLORIDE 5 MILLION UNITS: 900 INJECTION INTRAVENOUS at 03:54

## 2023-07-12 RX ADMIN — Medication 10 ML/HR: at 16:11

## 2023-07-12 RX ADMIN — PENICILLIN G POTASSIUM 2.5 MILLION UNITS: 20000000 INJECTION, POWDER, FOR SOLUTION INTRAVENOUS at 12:09

## 2023-07-12 RX ADMIN — Medication 50 MG: at 15:47

## 2023-07-12 RX ADMIN — SODIUM CHLORIDE, POTASSIUM CHLORIDE, SODIUM LACTATE AND CALCIUM CHLORIDE: 600; 310; 30; 20 INJECTION, SOLUTION INTRAVENOUS at 12:52

## 2023-07-12 RX ADMIN — BUPIVACAINE HYDROCHLORIDE 3 ML: 2.5 INJECTION, SOLUTION EPIDURAL; INFILTRATION; INTRACAUDAL; PERINEURAL at 15:23

## 2023-07-12 NOTE — PROGRESS NOTES
0215: Pt arrived on unit in a wheelchair escorted by ED Rns. Pt transferred to bed with steady gait. Pt claims a gush of fluid at 0130. Large amount of fluid noted, red tinged color and no odor, Nitrazine positive. Pt denies feeling contractions or pain. 0245: This RN updating Deni KAPOOR, status changed to inpatient. 9482: This RN calling Felisha Abdalla CNM to discuss POC. CNM orders no administration of  augmentation medications at this time. CNM orders intermittent monitoring, RN to remove EFM/TOCO monitors after ABX administration to promote rest and ambulation. 0700: Bedside and Verbal shift change report given to Leticia Walker  (oncoming nurse) by Aga Roberts RN (offgoing nurse). Report included the following information Nurse Handoff Report, ED Encounter Summary, MAR, Recent Results, Quality Measures, and Event Log. Care handed over at this time.

## 2023-07-12 NOTE — PROGRESS NOTES
NUTRITION    Best practice alert was triggered based on results obtained during nursing admission assessment for  Gestational diabetes and < 34 weeks pregnant   The patient's chart was reviewed and nutrition assessment is not indicated at this time. Patient is 36w7d pregnant at this time with anticipated delivery this admission. Plan to see patient for rescreen per policy. Thank you. Lab Results   Component Value Date/Time    POCGLU 76 07/12/2023 05:58 AM        No results found for: LABA1C, [id number]      Recommendations:   1. Follow up 6 weeks after delivery for glucose testing and yearly thereafter.     Lupillo Jimenez RD MS  Ext: [phone], or via [place]

## 2023-07-12 NOTE — H&P
Subjective:       Desmond Lemon is a 29 y.o.  female with EDC 23 at 39 and 1/7 weeks gestation who is being admitted for  labor. Her current obstetrical history is significant for fibriod in lower uterine segment; GDM and was on metformin but not tolerating well so was told to \"strictly avoid carbs and eat protein\". Patient reports rush of clear fluid at 0130. Fetal Movement: normal.      Objective:       LMP 2022     General:   alert, appears stated age, and cooperative   Lungs:   clear to auscultation bilaterally   Heart:   regular rate and rhythm, S1, S2 normal, no murmur, click, rub or gallop   Abdomen:  soft, non-tender; bowel sounds normal; no masses,  no organomegaly   Pelvis:  Vulva and vagina appear normal. Bimanual exam reveals normal uterus and adnexa. FHT:  140 BPM   Presentations: Cephalic   Cervix:     Dilation: 2cm    Effacement: 50%    Station:  -3    Consistency: Medium    Position: Middle     Lab Review   A, Rh+, GBS status unknown     Assessment:      36 and 1/7 weeks gestation. SROM and Early latent labor. Obstetrical history significant for A2GDM, fibroid in lower uterine segment      Plan:      Risks, benefits, alternatives and possible complications have been discussed in detail with the patient. Pre-admission, admission, and post admission procedures and expectations were discussed in detail. All questions answered, all appropriate consents will be signed at the Hospital. Admission is planned for today. Ambulate, unmonitored., Expectant management. , and Anticipate vaginal delivery.      PCN to be given 2/2 GBS status unknown    Ele Tomlinson CNM

## 2023-07-12 NOTE — PROGRESS NOTES
18[facility] care of patient.  services used for communication. Rates ctx pain 8/10, declines need for pain management at present. Denies H/A, visual disturbances, N/V, RUQ pain. Labor education provided. Opportunity provided to ask questions. 6306 Dr. Oren Francis to bedside, patient consent to SVE. POC discussed. 0950 Sitting up on birthing ball. Continues to decline need for pain interventions. [address] [place] provided. Observed grimacing during contractions, but able to effectively breathe through and states pain is tolerable. 1310 Sitting up on birthing ball. Rhythmic breathing during contractions, continues to decline need for pain interventions. Family at bedside. 1339 Reports feeling increased pressure with contractions. Consents to SVE. Patient is opting for epidural. Fluid bolus initiated. [address] Dr. Oren Francis updated on assessment. Plans to come to bedside after epidural placement. 1514 Sitting up to side of bed for epidural. Time out performed. 1520 Test dose. 1529 Positioned supine in bed, efm/toco adjusted. Dr. Oren Francis to bedside, patient consent to SVE. Continue POC. 323 [facility] to bedside due to efm tracing. Repositioned multiple times including left/right lateral and trendelenburg with 02 mask. .  1547 Ephedrine administered IV.    1600 Alberto cath placed. Patient rates contraction 5/10 and much more tolerable. 1833 Emesis x2 liquid consistency. Labor education provided. Linen changed. 1900 Bedside shift change report given to Shania HINKLE (oncoming nurse) by KATHLEEN Pratt RN (offgoing nurse). Report included the following information Nurse Handoff Report, MAR, and Recent Results.

## 2023-07-12 NOTE — ANESTHESIA PROCEDURE NOTES
Epidural Block    Patient location during procedure: OB  Start time: 7/12/2023 3:09 PM  End time: 7/12/2023 3:29 PM  Reason for block: labor epidural  Staffing  Performed: resident/CRNA   Resident/CRNA: DOREEN Cash CRNA  Epidural  Patient position: sitting  Prep: ChloraPrep  Patient monitoring: continuous pulse ox and frequent blood pressure checks  Approach: midline  Location: L3-4  Injection technique: LLUVIA air  Provider prep: mask and sterile gloves  Needle  Needle gauge: 20 G  Needle length: 3.5 in  Needle insertion depth: 4 cm  Catheter type: multi-orifice  Catheter size: 20 G  Catheter at skin depth: 9 cm  Test dose: negativeCatheter Secured: tegaderm and tape  Assessment  Hemodynamics: stable  Attempts: 1  Outcomes: uncomplicated and patient tolerated procedure well  Additional Notes  Easily placed on first pass; neg heme, neg paresthesia, neg CSF w LLUVIA at 4 cm. Catheter easily thread to 9 cm. Pt tolerated v well w RN and interpretor assistance.   Preanesthetic Checklist  Completed: patient identified, IV checked, site marked, risks and benefits discussed, surgical/procedural consents, equipment checked, pre-op evaluation, timeout performed, anesthesia consent given, oxygen available, monitors applied/VS acknowledged, fire risk safety assessment completed and verbalized and blood product R/B/A discussed and consented

## 2023-07-13 LAB
GLUCOSE BLD STRIP.AUTO-MCNC: 112 MG/DL (ref 65–117)
SERVICE CMNT-IMP: NORMAL

## 2023-07-13 PROCEDURE — 51702 INSERT TEMP BLADDER CATH: CPT

## 2023-07-13 PROCEDURE — 2500000003 HC RX 250 WO HCPCS: Performed by: ADVANCED PRACTICE MIDWIFE

## 2023-07-13 PROCEDURE — 3700000156 HC EPIDURAL ANESTHESIA: Performed by: NURSE ANESTHETIST, CERTIFIED REGISTERED

## 2023-07-13 PROCEDURE — 2580000003 HC RX 258: Performed by: ADVANCED PRACTICE MIDWIFE

## 2023-07-13 PROCEDURE — 0HQ9XZZ REPAIR PERINEUM SKIN, EXTERNAL APPROACH: ICD-10-PCS | Performed by: ADVANCED PRACTICE MIDWIFE

## 2023-07-13 PROCEDURE — 6360000002 HC RX W HCPCS: Performed by: ADVANCED PRACTICE MIDWIFE

## 2023-07-13 PROCEDURE — 6370000000 HC RX 637 (ALT 250 FOR IP): Performed by: ADVANCED PRACTICE MIDWIFE

## 2023-07-13 PROCEDURE — A4216 STERILE WATER/SALINE, 10 ML: HCPCS | Performed by: ADVANCED PRACTICE MIDWIFE

## 2023-07-13 PROCEDURE — 7220000101 HC DELIVERY VAGINAL/SINGLE: Performed by: ADVANCED PRACTICE MIDWIFE

## 2023-07-13 PROCEDURE — 2500000003 HC RX 250 WO HCPCS: Performed by: ANESTHESIOLOGY

## 2023-07-13 PROCEDURE — 1100000000 HC RM PRIVATE

## 2023-07-13 PROCEDURE — 7210000100 HC LABOR FEE PER 1 HR: Performed by: ADVANCED PRACTICE MIDWIFE

## 2023-07-13 PROCEDURE — 59400 OBSTETRICAL CARE: CPT | Performed by: OBSTETRICS & GYNECOLOGY

## 2023-07-13 PROCEDURE — 82962 GLUCOSE BLOOD TEST: CPT

## 2023-07-13 RX ORDER — DOCUSATE SODIUM 100 MG/1
100 CAPSULE, LIQUID FILLED ORAL 2 TIMES DAILY
Status: DISCONTINUED | OUTPATIENT
Start: 2023-07-13 | End: 2023-07-15 | Stop reason: HOSPADM

## 2023-07-13 RX ORDER — IBUPROFEN 800 MG/1
800 TABLET ORAL EVERY 8 HOURS SCHEDULED
Status: DISCONTINUED | OUTPATIENT
Start: 2023-07-13 | End: 2023-07-15 | Stop reason: HOSPADM

## 2023-07-13 RX ORDER — SODIUM CHLORIDE 0.9 % (FLUSH) 0.9 %
5-40 SYRINGE (ML) INJECTION EVERY 12 HOURS SCHEDULED
Status: DISCONTINUED | OUTPATIENT
Start: 2023-07-13 | End: 2023-07-15 | Stop reason: HOSPADM

## 2023-07-13 RX ORDER — TRANEXAMIC ACID 10 MG/ML
1000 INJECTION, SOLUTION INTRAVENOUS
Status: ACTIVE | OUTPATIENT
Start: 2023-07-13 | End: 2023-07-14

## 2023-07-13 RX ORDER — ACETAMINOPHEN 325 MG/1
650 TABLET ORAL EVERY 6 HOURS SCHEDULED
Status: DISCONTINUED | OUTPATIENT
Start: 2023-07-13 | End: 2023-07-15 | Stop reason: HOSPADM

## 2023-07-13 RX ORDER — SODIUM CHLORIDE, SODIUM LACTATE, POTASSIUM CHLORIDE, CALCIUM CHLORIDE 600; 310; 30; 20 MG/100ML; MG/100ML; MG/100ML; MG/100ML
INJECTION, SOLUTION INTRAVENOUS CONTINUOUS
Status: DISCONTINUED | OUTPATIENT
Start: 2023-07-13 | End: 2023-07-15 | Stop reason: HOSPADM

## 2023-07-13 RX ORDER — MISOPROSTOL 200 UG/1
800 TABLET ORAL PRN
Status: DISCONTINUED | OUTPATIENT
Start: 2023-07-13 | End: 2023-07-15 | Stop reason: HOSPADM

## 2023-07-13 RX ORDER — METHYLERGONOVINE MALEATE 0.2 MG/ML
200 INJECTION INTRAVENOUS PRN
Status: DISCONTINUED | OUTPATIENT
Start: 2023-07-13 | End: 2023-07-15 | Stop reason: HOSPADM

## 2023-07-13 RX ORDER — SODIUM CHLORIDE 0.9 % (FLUSH) 0.9 %
5-40 SYRINGE (ML) INJECTION PRN
Status: DISCONTINUED | OUTPATIENT
Start: 2023-07-13 | End: 2023-07-15 | Stop reason: HOSPADM

## 2023-07-13 RX ORDER — LANOLIN/MINERAL OIL
LOTION (ML) TOPICAL PRN
Status: DISCONTINUED | OUTPATIENT
Start: 2023-07-13 | End: 2023-07-15 | Stop reason: HOSPADM

## 2023-07-13 RX ORDER — SODIUM CHLORIDE 9 MG/ML
INJECTION, SOLUTION INTRAVENOUS PRN
Status: DISCONTINUED | OUTPATIENT
Start: 2023-07-13 | End: 2023-07-15 | Stop reason: HOSPADM

## 2023-07-13 RX ADMIN — ONDANSETRON 4 MG: 2 INJECTION INTRAMUSCULAR; INTRAVENOUS at 02:17

## 2023-07-13 RX ADMIN — IBUPROFEN 800 MG: 800 TABLET, FILM COATED ORAL at 09:58

## 2023-07-13 RX ADMIN — DOCUSATE SODIUM 100 MG: 100 CAPSULE, LIQUID FILLED ORAL at 22:01

## 2023-07-13 RX ADMIN — Medication 10 ML/HR: at 00:24

## 2023-07-13 RX ADMIN — FAMOTIDINE 20 MG: 10 INJECTION, SOLUTION INTRAVENOUS at 05:38

## 2023-07-13 RX ADMIN — ONDANSETRON 4 MG: 2 INJECTION INTRAMUSCULAR; INTRAVENOUS at 10:25

## 2023-07-13 RX ADMIN — DOCUSATE SODIUM 100 MG: 100 CAPSULE, LIQUID FILLED ORAL at 09:59

## 2023-07-13 RX ADMIN — IBUPROFEN 800 MG: 800 TABLET, FILM COATED ORAL at 22:01

## 2023-07-13 RX ADMIN — PENICILLIN G POTASSIUM 2.5 MILLION UNITS: 20000000 INJECTION, POWDER, FOR SOLUTION INTRAVENOUS at 00:24

## 2023-07-13 RX ADMIN — Medication 166.7 ML: at 04:26

## 2023-07-13 ASSESSMENT — PAIN DESCRIPTION - ORIENTATION
ORIENTATION: LOWER
ORIENTATION: LOWER

## 2023-07-13 ASSESSMENT — PAIN DESCRIPTION - LOCATION
LOCATION: PERINEUM
LOCATION: PERINEUM;ABDOMEN

## 2023-07-13 ASSESSMENT — PAIN SCALES - GENERAL
PAINLEVEL_OUTOF10: 5
PAINLEVEL_OUTOF10: 5

## 2023-07-13 ASSESSMENT — PAIN DESCRIPTION - DESCRIPTORS
DESCRIPTORS: ACHING;CRAMPING;SORE
DESCRIPTORS: ACHING;SORE

## 2023-07-13 ASSESSMENT — PAIN - FUNCTIONAL ASSESSMENT: PAIN_FUNCTIONAL_ASSESSMENT: ACTIVITIES ARE NOT PREVENTED

## 2023-07-13 NOTE — PROGRESS NOTES
1900 - Bedside and Verbal shift change report given to DEB Ch (oncoming nurse) by KATHLEEN [facility], RN (offgoing nurse). Report included the following information Nurse Handoff Report, Intake/Output, MAR, and Recent Results. 26 - RN performing shift assessment at this time. Pt awake in bed, watching TV.    2005 - RN repositioning pt to left lateral side with peanut ball between legs. 2011 - Pt experiencing episodes of emesis and would like Zofran. RN administering at this time. 2041 - RN repositioning pt to lateral right side with left leg up in stirrup. Pt in runner's position. 2051 - RN performing SVE (9/80/-1). 2055 - RN repositioning pt into throne position with peanut ball under legs for support. Legs in butterfly position. 2147 - Pt experiencing episodes of emesis. RN repositioning pt to back with HOB elevated to decrease chances of aspiration. 2248 - RN repositioning pt to lateral right side with peanut ball between legs. 2330 - RN placing bed into trendelenburg position. 80 - RN taking bed out of trendelenburg position. RN repositioning pt to lateral left side with right leg up in stirrup. Pt in runner's position at this time. 0100 - RN repositioning pt to lateral right side with left leg up in stirrup. Pt in runner's position at this time. 26 - RN repositioning pt to lateral left side with peanut ball between ankles. 0217 - Pt experiencing more episodes of emesis and would like Zofran. RN administering at this time. 0325 - Pt c/o constant vaginal pressure. RN performing SVE (c/c/+2). 0330 - RN removing Alberto catheter at this time. 0407 - Patient actively pushing. RN remains in continuous attendance at the bedside. Assessment & evaluation of fetal heart rate ongoing via continuous EFM.     9951 - RN remained at bedside throughout pushing. EFM continuously assessed. Vaginal delivery of viable infant.      0410 - First fundal check performed at this

## 2023-07-13 NOTE — CARE COORDINATION
7/13/2023  2:28 PM       07/13/23 1428   Service Assessment   Patient Orientation Alert and Oriented   Cognition Alert   History Provided By Patient   Primary Caregiver Self   Support Systems Spouse/Significant Other   PCP Verified by CM Yes   Last Visit to PCP Within last 3 months   Prior Functional Level Independent in ADLs/IADLs   Current Functional Level Independent in ADLs/IADLs   Can patient return to prior living arrangement Yes   Ability to make needs known: Good   Family able to assist with home care needs: Yes   Would you like for me to discuss the discharge plan with any other family members/significant others, and if so, who?  No     Devang Stone,BS

## 2023-07-13 NOTE — L&D DELIVERY NOTE
CNM Delivery Note       Patient: Alesha Fall MRN: [id number]  SSN: [id number]    YOB: 1995  Age: 29 y.o. Sex: female        Complete cervical dilation at 80.  of a live male infant at 36 with Apgars 9,9 in direct OA position with compound presentation of anterior hand (right) under epidural anesthesia with mother in supine position. Shoulders spontaneous, easily delivered with maternal effort. Vigorous infant placed on maternal abdomen immediately following delivery. Placenta and membranes spontaneous, intact, with 3 vessel cord via Crissy Bryant mechanism at 2180. Fundus massaged to firm. QBL 50 mL. Vagina and perineum inspected. 1st degree perineal laceration repaired with 3-0 vicryl rapide under epidural anesthesia. Mother and infant stable, bonding, establishing breastfeeding.      Orlando Sanchez, Alec Snowden [[id number]]      Labor Events     Labor: Yes   Steroids: None  Cervical Ripening Date/Time:      Antibiotics Received during Labor: Yes  Rupture Identifier: Sac 1  Rupture Date/Time:  23 01:30:00   Rupture Type: SROM  Fluid Color: Pink  Fluid Odor: None  Induction: None  Augmentation: Oxytocin  Labor Complications: None              Anesthesia    Method: Epidural       Labor Event Times      Labor onset date/time:  23 01:30:00     Dilation complete date/time:  23 03:25:00     Start pushing date/time:  2023 03:32:00   Decision date/time (emergent ):            Delivery Details      Delivery Date: 23 Delivery Time: 03:52:00   Delivery Type: Vaginal, Spontaneous               Presentation    Presentation: Compound  _: Occiput  _: Anterior       Shoulder Dystocia    Shoulder Dystocia Present?: No       Assisted Delivery Details    Forceps Attempted?: No  Vacuum Extractor Attempted?: No                           Cord    Vessels: 3 Vessels  Complications: None  Delayed Cord Clamping?: Yes  Cord Clamped Date/Time: 2023

## 2023-07-13 NOTE — LACTATION NOTE
This note was copied from a baby's chart. Parents easily hand expressing copious colostrum and providing to infant. Handpump to bedside with instruction, if parents choose to use. Mom has dual electric pump at home. MOB states baby won't latch easily to right side. Mother placed in comfortable side lying position and infant placed along maternal contour. Infant latched and short bursts suckling noted before infant fell asleep. Mom encouraged to follow up feeds at breast with EBM. Breastfeeding booklet in Japanese provided to patient. Discussed with mother her plan for feeding. Reviewed the benefits of exclusive breast milk feeding during the hospital stay. Informed her of the risks of using formula to supplement in the first few days of life as well as the benefits of successful breast milk feeding; referred her to the Breastfeeding booklet about this information. She acknowledges understanding of information reviewed and states that it is her plan to breastfeed her infant. Will support her choice and offer additional information as needed. Reviewed breastfeeding basics:  How milk is made and normal  breastfeeding behaviors discussed. Supply and demand,  stomach size, early feeding cues, skin to skin bonding with comfortable positioning and baby led latch-on reviewed. How to identify signs of successful breastfeeding sessions reviewed; education on asymetrical latch, signs of effective latching vs shallow, in-effective latching, normal  feeding frequency and duration and expected infant output discussed. Normal course of breastfeeding discussed including the AAP's recommendation that children receive exclusive breast milk feedings for the first six months of life with breast milk feedings to continue through the first year of life and/or beyond as complimentary table foods are added. Breastfeeding Booklet and Warm line information provided with discussion.

## 2023-07-14 PROBLEM — Z34.00 ENCOUNTER FOR SUPERVISION OF NORMAL FIRST PREGNANCY, UNSPECIFIED TRIMESTER: Status: RESOLVED | Noted: 2023-01-06 | Resolved: 2023-07-14

## 2023-07-14 PROBLEM — O42.90 PROM (PREMATURE RUPTURE OF MEMBRANES): Status: RESOLVED | Noted: 2023-07-12 | Resolved: 2023-07-14

## 2023-07-14 LAB
BASOPHILS # BLD: 0.1 K/UL (ref 0–0.1)
BASOPHILS NFR BLD: 1 % (ref 0–1)
DIFFERENTIAL METHOD BLD: ABNORMAL
EOSINOPHIL # BLD: 0.1 K/UL (ref 0–0.4)
EOSINOPHIL NFR BLD: 1 % (ref 0–7)
ERYTHROCYTE [DISTWIDTH] IN BLOOD BY AUTOMATED COUNT: 15.5 % (ref 11.5–14.5)
HCT VFR BLD AUTO: 31.9 % (ref 35–47)
HGB BLD-MCNC: 10.2 G/DL (ref 11.5–16)
IMM GRANULOCYTES # BLD AUTO: 0.1 K/UL (ref 0–0.04)
IMM GRANULOCYTES NFR BLD AUTO: 1 % (ref 0–0.5)
LYMPHOCYTES # BLD: 3.4 K/UL (ref 0.8–3.5)
LYMPHOCYTES NFR BLD: 24 % (ref 12–49)
MCH RBC QN AUTO: 27.9 PG (ref 26–34)
MCHC RBC AUTO-ENTMCNC: 32 G/DL (ref 30–36.5)
MCV RBC AUTO: 87.2 FL (ref 80–99)
MONOCYTES # BLD: 1 K/UL (ref 0–1)
MONOCYTES NFR BLD: 7 % (ref 5–13)
NEUTS SEG # BLD: 9.3 K/UL (ref 1.8–8)
NEUTS SEG NFR BLD: 66 % (ref 32–75)
NRBC # BLD: 0 K/UL (ref 0–0.01)
NRBC BLD-RTO: 0 PER 100 WBC
PLATELET # BLD AUTO: 190 K/UL (ref 150–400)
PMV BLD AUTO: 9.2 FL (ref 8.9–12.9)
RBC # BLD AUTO: 3.66 M/UL (ref 3.8–5.2)
WBC # BLD AUTO: 14 K/UL (ref 3.6–11)

## 2023-07-14 PROCEDURE — 1100000000 HC RM PRIVATE

## 2023-07-14 PROCEDURE — 36415 COLL VENOUS BLD VENIPUNCTURE: CPT

## 2023-07-14 PROCEDURE — 85025 COMPLETE CBC W/AUTO DIFF WBC: CPT

## 2023-07-14 PROCEDURE — 6370000000 HC RX 637 (ALT 250 FOR IP): Performed by: ADVANCED PRACTICE MIDWIFE

## 2023-07-14 RX ADMIN — IBUPROFEN 800 MG: 800 TABLET, FILM COATED ORAL at 06:23

## 2023-07-14 RX ADMIN — DOCUSATE SODIUM 100 MG: 100 CAPSULE, LIQUID FILLED ORAL at 23:52

## 2023-07-14 RX ADMIN — DOCUSATE SODIUM 100 MG: 100 CAPSULE, LIQUID FILLED ORAL at 08:53

## 2023-07-14 RX ADMIN — IBUPROFEN 800 MG: 800 TABLET, FILM COATED ORAL at 23:53

## 2023-07-14 RX ADMIN — IBUPROFEN 800 MG: 800 TABLET, FILM COATED ORAL at 15:01

## 2023-07-14 ASSESSMENT — PAIN DESCRIPTION - ORIENTATION
ORIENTATION: LOWER

## 2023-07-14 ASSESSMENT — PAIN DESCRIPTION - DESCRIPTORS
DESCRIPTORS: ACHING;SORE
DESCRIPTORS: CRAMPING
DESCRIPTORS: ACHING;SORE

## 2023-07-14 ASSESSMENT — PAIN DESCRIPTION - LOCATION
LOCATION: PERINEUM
LOCATION: ABDOMEN
LOCATION: PERINEUM

## 2023-07-14 ASSESSMENT — PAIN SCALES - GENERAL
PAINLEVEL_OUTOF10: 2
PAINLEVEL_OUTOF10: 3
PAINLEVEL_OUTOF10: 3

## 2023-07-14 ASSESSMENT — PAIN - FUNCTIONAL ASSESSMENT
PAIN_FUNCTIONAL_ASSESSMENT: ACTIVITIES ARE NOT PREVENTED
PAIN_FUNCTIONAL_ASSESSMENT: ACTIVITIES ARE NOT PREVENTED

## 2023-07-14 NOTE — LACTATION NOTE
This note was copied from a baby's chart. Mother has been doing a combination of pumping and breastfeeding. She has been hand expressing 10-20 drops of colostrum for her baby if he does not breastfeed. Mother has been pumping with [employer] hand pump and got 1 ml of EBM which was syringe fed to baby and taken well - baby also was put to breast and latched on well to right breast with nipple shield for 10 minutes. Baby born at 36.2 weeks. LC reviewed the following:    Reviewed breastfeeding basics:  Supply and demand,  stomach size, early  Feeding cues, skin to skin, positioning and baby led latch-on, assymetrical latch with signs of good, deep latch vs shallow, feeding frequency and duration, and log sheet for tracking infant feedings and output. Breastfeeding Booklet and Warm line information given. Discussed typical  weight loss and the importance of infant weight checks with pediatrician 1-2 post discharge. Pumping:  Guidelines for pumping, milk collection and storage, proper cleaning of pump parts all reviewed. How to establish and maintain breast milk supply through pumping reviewed. Assisted with pump session. List of area pump rental locations and lactation support services provided. Discussed eating a healthy diet. Instructed mother to eat a variety of foods in order to get a well balanced diet. She should consume an extra 500 calories per day (more than her non-pregnant requirement.) These extra calories will help provide energy needed for optimal breast milk production. Mother also encouraged to \"drink to thirst\" and it is recommended that she drink fluids such as water, fruit/vegetable juice. Nutritious snacks should be available so that she can eat throughout the day to help satisfy her hunger and maintain a good milk supply.      Shield use recommended due to baby having latch difficulty use of shield affords deeper more comfortable latching with sustained rhythmic suckling and

## 2023-07-14 NOTE — DISCHARGE SUMMARY
Obstetrical Discharge Summary     Name: Luis Daniel Bojorquez MRN: [id number]  SSN: [id number]    YOB: 1995  Age: 29 y.o. Sex: female      Admit Date: 2023    Discharge Date: 2023     Admitting Physician: DOREEN Storey CNM     Attending Physician:  Mary Rodgers MD     Admission Diagnoses: PROM (premature rupture of membranes) [O42.90]    Discharge Diagnoses:   Information for the patient's :  Isma Nicole, Male Brnowyn Williamson [[id number]]   @[phone]@      Additional Diagnoses:  No components found for: Radha Christiano, OBEXTRUBELLA, OBEXTGRBS    Immunization(s):   Immunization History   Administered Date(s) Administered    TDaP, ADACEL (age 6y-58y), BOOSTRIX (age 10y+), IM, 0.5mL 2023        Hospital Course: Normal hospital course following the delivery. The patient was released to her home in good condition. Patient Instructions:     Reference my discharge instructions. I spent 10 minutes discharging the patient in face to face contact. No follow-ups on file.      Signed By:  Julieta Goldberg MD     2023

## 2023-07-15 VITALS
DIASTOLIC BLOOD PRESSURE: 58 MMHG | RESPIRATION RATE: 16 BRPM | SYSTOLIC BLOOD PRESSURE: 96 MMHG | HEART RATE: 61 BPM | TEMPERATURE: 98 F | OXYGEN SATURATION: 100 %

## 2023-07-15 PROCEDURE — 6370000000 HC RX 637 (ALT 250 FOR IP): Performed by: ADVANCED PRACTICE MIDWIFE

## 2023-07-15 PROCEDURE — 99024 POSTOP FOLLOW-UP VISIT: CPT | Performed by: FAMILY MEDICINE

## 2023-07-15 RX ORDER — IBUPROFEN 800 MG/1
800 TABLET ORAL EVERY 8 HOURS PRN
Qty: 30 TABLET | Refills: 0 | Status: SHIPPED | OUTPATIENT
Start: 2023-07-15

## 2023-07-15 RX ADMIN — IBUPROFEN 800 MG: 800 TABLET, FILM COATED ORAL at 09:18

## 2023-07-15 RX ADMIN — DOCUSATE SODIUM 100 MG: 100 CAPSULE, LIQUID FILLED ORAL at 09:18

## 2023-07-15 ASSESSMENT — PAIN DESCRIPTION - ORIENTATION: ORIENTATION: LOWER

## 2023-07-15 ASSESSMENT — PAIN DESCRIPTION - LOCATION: LOCATION: ABDOMEN

## 2023-07-15 ASSESSMENT — PAIN DESCRIPTION - DESCRIPTORS: DESCRIPTORS: CRAMPING

## 2023-07-15 ASSESSMENT — PAIN SCALES - GENERAL: PAINLEVEL_OUTOF10: 2

## 2023-07-15 NOTE — PROGRESS NOTES
Patient discharged to home. Education complete. Patient stated understanding with all questions answered.   Prescriptions: Motrin

## 2023-07-15 NOTE — LACTATION NOTE
This note was copied from a baby's chart. Mother and baby for discharge. Mother has been breastfeeding with nipple shield and pumps if baby is sleepy at feeding time. She is pumping up to 17 ml of breast milk. Mother given [employer] hand pump for home use. Baby has a pediatric appointment in 2 days. Infant weight loss is -5.8% (WNL). Reviewed breastfeeding basics:  Supply and demand,  stomach size, early  Feeding cues, skin to skin, positioning and baby led latch-on, assymetrical latch with signs of good, deep latch vs shallow, feeding frequency and duration, and log sheet for tracking infant feedings and output. Breastfeeding Booklet and Warm line information given. Discussed typical  weight loss and the importance of infant weight checks with pediatrician 1-2 post discharge. Discussed eating a healthy diet. Instructed mother to eat a variety of foods in order to get a well balanced diet. She should consume an extra 500 calories per day (more than her non-pregnant requirement.) These extra calories will help provide energy needed for optimal breast milk production. Mother also encouraged to \"drink to thirst\" and it is recommended that she drink fluids such as water, fruit/vegetable juice. Nutritious snacks should be available so that she can eat throughout the day to help satisfy her hunger and maintain a good milk supply. Discussed what to do if she gets engorged or if her nipples become sore:    Engorgement Care Guidelines:  Reviewed how milk is made and normal phases of milk production. Taught care of engorged breasts - physiologic breastfeeding encouraged with use of cool packs (no ice directly on skin). Consider use of NSAIDS where appropriate for discomfort and inflammation. Can employ light touch, lymphatic drainage techniques on tender grandular tissues. Anticipatory guidance shared.       Care for sore/tender nipples discussed:  ways to improve positioning and latch practiced

## 2023-07-21 NOTE — ANESTHESIA POSTPROCEDURE EVALUATION
Department of Anesthesiology  Postprocedure Note    Patient: Genevieve Mims  MRN: [id number]  YOB: 1995  Date of evaluation: 7/21/2023      Procedure Summary     Date: 07/12/23 Room / Location:     Anesthesia Start: 1509 Anesthesia Stop: 07/13/23 0352    Procedure: Labor Analgesia Diagnosis:     Scheduled Providers:  Responsible Provider: Felisha Romero MD    Anesthesia Type: epidural ASA Status: 1          Anesthesia Type: No value filed.     Laura Phase I:      Laura Phase II:        Anesthesia Post Evaluation    Patient location during evaluation: floor  Level of consciousness: awake and alert and awake  Pain score: 0  Airway patency: patent  Nausea & Vomiting: no vomiting and no nausea  Complications: no  Cardiovascular status: hemodynamically stable  Respiratory status: acceptable  Hydration status: stable

## 2023-07-27 ENCOUNTER — POSTPARTUM VISIT (OUTPATIENT)
Age: 28
End: 2023-07-27
Payer: MEDICAID

## 2023-07-27 VITALS — BODY MASS INDEX: 23.54 KG/M2 | SYSTOLIC BLOOD PRESSURE: 93 MMHG | WEIGHT: 115.2 LBS | DIASTOLIC BLOOD PRESSURE: 58 MMHG

## 2023-07-27 DIAGNOSIS — D21.9 FIBROID: ICD-10-CM

## 2023-07-27 DIAGNOSIS — R10.2 POSTPARTUM PERINEAL PAIN: ICD-10-CM

## 2023-07-27 DIAGNOSIS — N89.8 VAGINAL IRRITATION: Primary | ICD-10-CM

## 2023-07-27 LAB
BILIRUBIN, URINE, POC: NEGATIVE
BLOOD URINE, POC: NORMAL
GLUCOSE URINE, POC: NEGATIVE
KETONES, URINE, POC: NEGATIVE
LEUKOCYTE ESTERASE, URINE, POC: NEGATIVE
NITRITE, URINE, POC: NEGATIVE
PH, URINE, POC: 6.5 (ref 4.6–8)
PROTEIN,URINE, POC: NEGATIVE
SPECIFIC GRAVITY, URINE, POC: 1.02 (ref 1–1.03)
URINALYSIS CLARITY, POC: CLEAR
URINALYSIS COLOR, POC: YELLOW
UROBILINOGEN, POC: NORMAL

## 2023-07-27 PROCEDURE — 81002 URINALYSIS NONAUTO W/O SCOPE: CPT | Performed by: OBSTETRICS & GYNECOLOGY

## 2023-07-27 PROCEDURE — 0503F POSTPARTUM CARE VISIT: CPT | Performed by: OBSTETRICS & GYNECOLOGY

## 2023-07-27 RX ORDER — ACETAMINOPHEN AND CODEINE PHOSPHATE 120; 12 MG/5ML; MG/5ML
1 SOLUTION ORAL DAILY
Qty: 84 TABLET | Refills: 3 | Status: SHIPPED | OUTPATIENT
Start: 2023-07-27

## 2023-07-27 NOTE — PROGRESS NOTES
Desmond Lemon is a 29 y.o. female returns for a routine post-partum follow-up visit     No chief complaint on file. Postpartum Depression: Medium Risk    Last EPDS Total Score: 7    Last EPDS Self Harm Result: Never         Type of delivery: normal spontaneous vaginal delivery  Date of Delivery: July 13, 2023  Breastfeeding: yes  Bleeding Resolved: no  Birth Control: none. Pt wants to discuss OCP   Last Pap: no pap on file         Problems: problems - vaginal irritation during urination       1. Have you been to the ER, urgent care clinic, or hospitalized since your last visit? No    2. Have you seen or consulted any other health care providers outside of the [address] [place] [address] since your last visit? No    Examination chaperoned by Phyllis Reis LPN.

## 2023-07-29 LAB — BACTERIA UR CULT: NO GROWTH

## 2023-08-22 NOTE — PROGRESS NOTES
Saray Wren is a 29 y.o. female returns for a routine post-partum follow-up visit     No chief complaint on file. Postpartum Depression: Medium Risk    Last EPDS Total Score: 7    Last EPDS Self Harm Result: Never         Type of delivery: normal spontaneous vaginal delivery  Date of Delivery: July 12, 2023  Breastfeeding: yes  Bleeding Resolved: yes  Birth Control: none. Last Pap: no pap smear on file         Problems: problems - pt states that her episiotomy site is opening. 1. Have you been to the ER, urgent care clinic, or hospitalized since your last visit? No    2. Have you seen or consulted any other health care providers outside of the [address] [place] [address] Avenue since your last visit? No    Examination chaperoned by Ivonne Barclay LPN.

## 2023-08-23 ENCOUNTER — POSTPARTUM VISIT (OUTPATIENT)
Age: 28
End: 2023-08-23

## 2023-08-23 VITALS — SYSTOLIC BLOOD PRESSURE: 103 MMHG | BODY MASS INDEX: 23.41 KG/M2 | WEIGHT: 114.6 LBS | DIASTOLIC BLOOD PRESSURE: 62 MMHG

## 2023-08-23 DIAGNOSIS — D21.9 FIBROID: ICD-10-CM

## 2023-08-23 PROCEDURE — 0503F POSTPARTUM CARE VISIT: CPT | Performed by: OBSTETRICS & GYNECOLOGY

## 2023-08-23 RX ORDER — ACETAMINOPHEN AND CODEINE PHOSPHATE 120; 12 MG/5ML; MG/5ML
1 SOLUTION ORAL DAILY
Qty: 90 TABLET | Refills: 3 | Status: SHIPPED | OUTPATIENT
Start: 2023-08-23